# Patient Record
Sex: FEMALE | Race: WHITE | NOT HISPANIC OR LATINO | Employment: UNEMPLOYED | URBAN - METROPOLITAN AREA
[De-identification: names, ages, dates, MRNs, and addresses within clinical notes are randomized per-mention and may not be internally consistent; named-entity substitution may affect disease eponyms.]

---

## 2021-03-25 ENCOUNTER — APPOINTMENT (EMERGENCY)
Dept: CT IMAGING | Facility: HOSPITAL | Age: 76
DRG: 177 | End: 2021-03-25
Payer: COMMERCIAL

## 2021-03-25 ENCOUNTER — HOSPITAL ENCOUNTER (INPATIENT)
Facility: HOSPITAL | Age: 76
LOS: 2 days | Discharge: HOME | DRG: 177 | End: 2021-03-27
Attending: EMERGENCY MEDICINE | Admitting: STUDENT IN AN ORGANIZED HEALTH CARE EDUCATION/TRAINING PROGRAM
Payer: COMMERCIAL

## 2021-03-25 ENCOUNTER — APPOINTMENT (EMERGENCY)
Dept: RADIOLOGY | Facility: HOSPITAL | Age: 76
DRG: 177 | End: 2021-03-25
Payer: COMMERCIAL

## 2021-03-25 DIAGNOSIS — J96.01 ACUTE RESPIRATORY FAILURE WITH HYPOXIA (HCC): ICD-10-CM

## 2021-03-25 DIAGNOSIS — U07.1 PNEUMONIA DUE TO COVID-19 VIRUS: Primary | ICD-10-CM

## 2021-03-25 DIAGNOSIS — N17.9 ACUTE KIDNEY INJURY (HCC): ICD-10-CM

## 2021-03-25 DIAGNOSIS — J12.82 PNEUMONIA DUE TO COVID-19 VIRUS: Primary | ICD-10-CM

## 2021-03-25 PROBLEM — E66.9 DIABETES MELLITUS TYPE 2 IN OBESE (HCC): Status: ACTIVE | Noted: 2021-03-25

## 2021-03-25 PROBLEM — I63.9 CVA (CEREBRAL VASCULAR ACCIDENT) (HCC): Status: ACTIVE | Noted: 2021-03-25

## 2021-03-25 PROBLEM — E11.69 DIABETES MELLITUS TYPE 2 IN OBESE (HCC): Status: ACTIVE | Noted: 2021-03-25

## 2021-03-25 PROBLEM — R53.1 WEAKNESS: Status: ACTIVE | Noted: 2021-03-25

## 2021-03-25 LAB
ALBUMIN SERPL BCP-MCNC: 3.2 G/DL (ref 3.5–5)
ALP SERPL-CCNC: 52 U/L (ref 46–116)
ALT SERPL W P-5'-P-CCNC: 27 U/L (ref 12–78)
ANION GAP SERPL CALCULATED.3IONS-SCNC: 9 MMOL/L (ref 4–13)
AST SERPL W P-5'-P-CCNC: 42 U/L (ref 5–45)
ATRIAL RATE: 67 BPM
BASOPHILS # BLD AUTO: 0.01 THOUSANDS/ΜL (ref 0–0.1)
BASOPHILS NFR BLD AUTO: 0 % (ref 0–1)
BILIRUB SERPL-MCNC: 0.61 MG/DL (ref 0.2–1)
BILIRUB UR QL STRIP: NEGATIVE
BUN SERPL-MCNC: 39 MG/DL (ref 5–25)
CALCIUM ALBUM COR SERPL-MCNC: 9.1 MG/DL (ref 8.3–10.1)
CALCIUM SERPL-MCNC: 8.5 MG/DL (ref 8.3–10.1)
CHLORIDE SERPL-SCNC: 99 MMOL/L (ref 100–108)
CLARITY UR: CLEAR
CO2 SERPL-SCNC: 26 MMOL/L (ref 21–32)
COLOR UR: YELLOW
CREAT SERPL-MCNC: 2.18 MG/DL (ref 0.6–1.3)
CRP SERPL QL: 37 MG/L
D DIMER PPP FEU-MCNC: 2.29 UG/ML FEU
EOSINOPHIL # BLD AUTO: 0.01 THOUSAND/ΜL (ref 0–0.61)
EOSINOPHIL NFR BLD AUTO: 0 % (ref 0–6)
ERYTHROCYTE [DISTWIDTH] IN BLOOD BY AUTOMATED COUNT: 13.4 % (ref 11.6–15.1)
FERRITIN SERPL-MCNC: 342 NG/ML (ref 8–388)
FLUAV RNA RESP QL NAA+PROBE: NEGATIVE
FLUBV RNA RESP QL NAA+PROBE: NEGATIVE
GFR SERPL CREATININE-BSD FRML MDRD: 22 ML/MIN/1.73SQ M
GLUCOSE SERPL-MCNC: 115 MG/DL (ref 65–140)
GLUCOSE SERPL-MCNC: 178 MG/DL (ref 65–140)
GLUCOSE UR STRIP-MCNC: NEGATIVE MG/DL
HCT VFR BLD AUTO: 41.6 % (ref 34.8–46.1)
HGB BLD-MCNC: 13.7 G/DL (ref 11.5–15.4)
HGB UR QL STRIP.AUTO: NEGATIVE
IMM GRANULOCYTES # BLD AUTO: 0.01 THOUSAND/UL (ref 0–0.2)
IMM GRANULOCYTES NFR BLD AUTO: 0 % (ref 0–2)
KETONES UR STRIP-MCNC: NEGATIVE MG/DL
LACTATE SERPL-SCNC: 1.7 MMOL/L (ref 0.5–2)
LEUKOCYTE ESTERASE UR QL STRIP: NEGATIVE
LIPASE SERPL-CCNC: 295 U/L (ref 73–393)
LYMPHOCYTES # BLD AUTO: 0.85 THOUSANDS/ΜL (ref 0.6–4.47)
LYMPHOCYTES NFR BLD AUTO: 26 % (ref 14–44)
MCH RBC QN AUTO: 29.4 PG (ref 26.8–34.3)
MCHC RBC AUTO-ENTMCNC: 32.9 G/DL (ref 31.4–37.4)
MCV RBC AUTO: 89 FL (ref 82–98)
MONOCYTES # BLD AUTO: 0.36 THOUSAND/ΜL (ref 0.17–1.22)
MONOCYTES NFR BLD AUTO: 11 % (ref 4–12)
NEUTROPHILS # BLD AUTO: 2.08 THOUSANDS/ΜL (ref 1.85–7.62)
NEUTS SEG NFR BLD AUTO: 63 % (ref 43–75)
NITRITE UR QL STRIP: NEGATIVE
NRBC BLD AUTO-RTO: 0 /100 WBCS
P AXIS: 55 DEGREES
PH UR STRIP.AUTO: 6 [PH]
PLATELET # BLD AUTO: 109 THOUSANDS/UL (ref 149–390)
PLATELET # BLD AUTO: 122 THOUSANDS/UL (ref 149–390)
PMV BLD AUTO: 12.5 FL (ref 8.9–12.7)
PMV BLD AUTO: 12.6 FL (ref 8.9–12.7)
POTASSIUM SERPL-SCNC: 4 MMOL/L (ref 3.5–5.3)
PR INTERVAL: 154 MS
PROT SERPL-MCNC: 7.3 G/DL (ref 6.4–8.2)
PROT UR STRIP-MCNC: NEGATIVE MG/DL
QRS AXIS: 51 DEGREES
QRSD INTERVAL: 74 MS
QT INTERVAL: 434 MS
QTC INTERVAL: 458 MS
RBC # BLD AUTO: 4.66 MILLION/UL (ref 3.81–5.12)
RSV RNA RESP QL NAA+PROBE: NEGATIVE
SARS-COV-2 RNA RESP QL NAA+PROBE: POSITIVE
SODIUM SERPL-SCNC: 134 MMOL/L (ref 136–145)
SP GR UR STRIP.AUTO: 1.02 (ref 1–1.03)
T WAVE AXIS: 30 DEGREES
TROPONIN I SERPL-MCNC: <0.02 NG/ML
TSH SERPL DL<=0.05 MIU/L-ACNC: 3.07 UIU/ML (ref 0.36–3.74)
UROBILINOGEN UR QL STRIP.AUTO: 0.2 E.U./DL
VENTRICULAR RATE: 67 BPM
WBC # BLD AUTO: 3.32 THOUSAND/UL (ref 4.31–10.16)

## 2021-03-25 PROCEDURE — 83690 ASSAY OF LIPASE: CPT | Performed by: PHYSICIAN ASSISTANT

## 2021-03-25 PROCEDURE — 82728 ASSAY OF FERRITIN: CPT | Performed by: FAMILY MEDICINE

## 2021-03-25 PROCEDURE — 93005 ELECTROCARDIOGRAM TRACING: CPT

## 2021-03-25 PROCEDURE — 36415 COLL VENOUS BLD VENIPUNCTURE: CPT | Performed by: PHYSICIAN ASSISTANT

## 2021-03-25 PROCEDURE — 85049 AUTOMATED PLATELET COUNT: CPT | Performed by: FAMILY MEDICINE

## 2021-03-25 PROCEDURE — 81003 URINALYSIS AUTO W/O SCOPE: CPT | Performed by: PHYSICIAN ASSISTANT

## 2021-03-25 PROCEDURE — 96361 HYDRATE IV INFUSION ADD-ON: CPT

## 2021-03-25 PROCEDURE — XW033E5 INTRODUCTION OF REMDESIVIR ANTI-INFECTIVE INTO PERIPHERAL VEIN, PERCUTANEOUS APPROACH, NEW TECHNOLOGY GROUP 5: ICD-10-PCS | Performed by: FAMILY MEDICINE

## 2021-03-25 PROCEDURE — 99285 EMERGENCY DEPT VISIT HI MDM: CPT | Performed by: PHYSICIAN ASSISTANT

## 2021-03-25 PROCEDURE — 86140 C-REACTIVE PROTEIN: CPT | Performed by: PHYSICIAN ASSISTANT

## 2021-03-25 PROCEDURE — 93010 ELECTROCARDIOGRAM REPORT: CPT | Performed by: INTERNAL MEDICINE

## 2021-03-25 PROCEDURE — 99285 EMERGENCY DEPT VISIT HI MDM: CPT

## 2021-03-25 PROCEDURE — 84443 ASSAY THYROID STIM HORMONE: CPT | Performed by: PHYSICIAN ASSISTANT

## 2021-03-25 PROCEDURE — 0241U HB NFCT DS VIR RESP RNA 4 TRGT: CPT | Performed by: PHYSICIAN ASSISTANT

## 2021-03-25 PROCEDURE — 83605 ASSAY OF LACTIC ACID: CPT | Performed by: PHYSICIAN ASSISTANT

## 2021-03-25 PROCEDURE — 85025 COMPLETE CBC W/AUTO DIFF WBC: CPT | Performed by: PHYSICIAN ASSISTANT

## 2021-03-25 PROCEDURE — 82948 REAGENT STRIP/BLOOD GLUCOSE: CPT

## 2021-03-25 PROCEDURE — G1004 CDSM NDSC: HCPCS

## 2021-03-25 PROCEDURE — 84484 ASSAY OF TROPONIN QUANT: CPT | Performed by: PHYSICIAN ASSISTANT

## 2021-03-25 PROCEDURE — 85379 FIBRIN DEGRADATION QUANT: CPT | Performed by: FAMILY MEDICINE

## 2021-03-25 PROCEDURE — 80053 COMPREHEN METABOLIC PANEL: CPT | Performed by: PHYSICIAN ASSISTANT

## 2021-03-25 PROCEDURE — 96365 THER/PROPH/DIAG IV INF INIT: CPT

## 2021-03-25 PROCEDURE — 87040 BLOOD CULTURE FOR BACTERIA: CPT | Performed by: PHYSICIAN ASSISTANT

## 2021-03-25 PROCEDURE — 74176 CT ABD & PELVIS W/O CONTRAST: CPT

## 2021-03-25 PROCEDURE — 99223 1ST HOSP IP/OBS HIGH 75: CPT | Performed by: FAMILY MEDICINE

## 2021-03-25 PROCEDURE — 71045 X-RAY EXAM CHEST 1 VIEW: CPT

## 2021-03-25 RX ORDER — GABAPENTIN 300 MG/1
300 CAPSULE ORAL
COMMUNITY

## 2021-03-25 RX ORDER — CHLORTHALIDONE 25 MG/1
25 TABLET ORAL DAILY
COMMUNITY
Start: 2020-10-30

## 2021-03-25 RX ORDER — MELOXICAM 15 MG/1
TABLET ORAL
COMMUNITY
Start: 2021-03-15

## 2021-03-25 RX ORDER — ATENOLOL 50 MG/1
100 TABLET ORAL DAILY
Status: DISCONTINUED | OUTPATIENT
Start: 2021-03-25 | End: 2021-03-27 | Stop reason: HOSPADM

## 2021-03-25 RX ORDER — CLOPIDOGREL BISULFATE 75 MG/1
75 TABLET ORAL DAILY
COMMUNITY
Start: 2021-01-27

## 2021-03-25 RX ORDER — MIRABEGRON 25 MG/1
25 TABLET, FILM COATED, EXTENDED RELEASE ORAL DAILY
COMMUNITY
Start: 2021-01-04

## 2021-03-25 RX ORDER — HEPARIN SODIUM 5000 [USP'U]/ML
5000 INJECTION, SOLUTION INTRAVENOUS; SUBCUTANEOUS EVERY 8 HOURS SCHEDULED
Status: DISCONTINUED | OUTPATIENT
Start: 2021-03-25 | End: 2021-03-27 | Stop reason: HOSPADM

## 2021-03-25 RX ORDER — ASCORBIC ACID 500 MG
1000 TABLET ORAL EVERY 12 HOURS SCHEDULED
Status: DISCONTINUED | OUTPATIENT
Start: 2021-03-25 | End: 2021-03-27 | Stop reason: HOSPADM

## 2021-03-25 RX ORDER — ASPIRIN 81 MG/1
81 TABLET, CHEWABLE ORAL DAILY
Status: DISCONTINUED | OUTPATIENT
Start: 2021-03-26 | End: 2021-03-27 | Stop reason: HOSPADM

## 2021-03-25 RX ORDER — PANTOPRAZOLE SODIUM 40 MG/1
TABLET, DELAYED RELEASE ORAL
COMMUNITY
Start: 2021-03-15

## 2021-03-25 RX ORDER — ASPIRIN 325 MG
325 TABLET ORAL DAILY
COMMUNITY

## 2021-03-25 RX ORDER — CYCLOBENZAPRINE HCL 5 MG
5 TABLET ORAL 3 TIMES DAILY PRN
COMMUNITY

## 2021-03-25 RX ORDER — MELATONIN
2000 DAILY
Status: DISCONTINUED | OUTPATIENT
Start: 2021-03-25 | End: 2021-03-27 | Stop reason: HOSPADM

## 2021-03-25 RX ORDER — CLOPIDOGREL BISULFATE 75 MG/1
75 TABLET ORAL DAILY
Status: DISCONTINUED | OUTPATIENT
Start: 2021-03-25 | End: 2021-03-27 | Stop reason: HOSPADM

## 2021-03-25 RX ORDER — GABAPENTIN 300 MG/1
300 CAPSULE ORAL
Status: DISCONTINUED | OUTPATIENT
Start: 2021-03-25 | End: 2021-03-27 | Stop reason: HOSPADM

## 2021-03-25 RX ORDER — SODIUM CHLORIDE 9 MG/ML
75 INJECTION, SOLUTION INTRAVENOUS CONTINUOUS
Status: DISCONTINUED | OUTPATIENT
Start: 2021-03-25 | End: 2021-03-26

## 2021-03-25 RX ORDER — ASPIRIN 325 MG
325 TABLET ORAL DAILY
Status: DISCONTINUED | OUTPATIENT
Start: 2021-03-25 | End: 2021-03-25

## 2021-03-25 RX ORDER — DOXYCYCLINE HYCLATE 100 MG/1
100 CAPSULE ORAL EVERY 12 HOURS
Status: DISCONTINUED | OUTPATIENT
Start: 2021-03-25 | End: 2021-03-27 | Stop reason: HOSPADM

## 2021-03-25 RX ORDER — OXYBUTYNIN CHLORIDE 5 MG/1
5 TABLET, EXTENDED RELEASE ORAL DAILY
Status: DISCONTINUED | OUTPATIENT
Start: 2021-03-25 | End: 2021-03-27 | Stop reason: HOSPADM

## 2021-03-25 RX ORDER — ONDANSETRON 2 MG/ML
INJECTION INTRAMUSCULAR; INTRAVENOUS
Status: COMPLETED
Start: 2021-03-25 | End: 2021-03-25

## 2021-03-25 RX ORDER — CYCLOBENZAPRINE HCL 10 MG
5 TABLET ORAL 3 TIMES DAILY PRN
Status: DISCONTINUED | OUTPATIENT
Start: 2021-03-25 | End: 2021-03-27 | Stop reason: HOSPADM

## 2021-03-25 RX ORDER — PANTOPRAZOLE SODIUM 40 MG/1
40 TABLET, DELAYED RELEASE ORAL DAILY
Status: DISCONTINUED | OUTPATIENT
Start: 2021-03-25 | End: 2021-03-27 | Stop reason: HOSPADM

## 2021-03-25 RX ORDER — HYDROXYZINE HYDROCHLORIDE 25 MG/1
25 TABLET, FILM COATED ORAL EVERY 6 HOURS
COMMUNITY
Start: 2021-03-15

## 2021-03-25 RX ORDER — ZINC SULFATE 50(220)MG
220 CAPSULE ORAL DAILY
Status: DISCONTINUED | OUTPATIENT
Start: 2021-03-25 | End: 2021-03-27 | Stop reason: HOSPADM

## 2021-03-25 RX ORDER — DEXAMETHASONE SODIUM PHOSPHATE 4 MG/ML
6 INJECTION, SOLUTION INTRA-ARTICULAR; INTRALESIONAL; INTRAMUSCULAR; INTRAVENOUS; SOFT TISSUE EVERY 24 HOURS
Status: DISCONTINUED | OUTPATIENT
Start: 2021-03-25 | End: 2021-03-27 | Stop reason: HOSPADM

## 2021-03-25 RX ORDER — ATORVASTATIN CALCIUM 40 MG/1
40 TABLET, FILM COATED ORAL
Status: DISCONTINUED | OUTPATIENT
Start: 2021-03-25 | End: 2021-03-27 | Stop reason: HOSPADM

## 2021-03-25 RX ORDER — HEPARIN SODIUM 5000 [USP'U]/ML
5000 INJECTION, SOLUTION INTRAVENOUS; SUBCUTANEOUS EVERY 8 HOURS SCHEDULED
Status: DISCONTINUED | OUTPATIENT
Start: 2021-03-25 | End: 2021-03-25

## 2021-03-25 RX ORDER — DOXYCYCLINE HYCLATE 100 MG/1
100 CAPSULE ORAL ONCE
Status: COMPLETED | OUTPATIENT
Start: 2021-03-25 | End: 2021-03-25

## 2021-03-25 RX ORDER — ACETAMINOPHEN 160 MG/5ML
650 SUSPENSION, ORAL (FINAL DOSE FORM) ORAL EVERY 6 HOURS PRN
Status: DISCONTINUED | OUTPATIENT
Start: 2021-03-25 | End: 2021-03-27 | Stop reason: HOSPADM

## 2021-03-25 RX ORDER — GLIPIZIDE 2.5 MG/1
2.5 TABLET, EXTENDED RELEASE ORAL DAILY
COMMUNITY
Start: 2021-01-27 | End: 2022-01-27

## 2021-03-25 RX ORDER — ATORVASTATIN CALCIUM 20 MG/1
20 TABLET, FILM COATED ORAL DAILY
COMMUNITY
Start: 2021-01-04

## 2021-03-25 RX ORDER — TOLTERODINE 4 MG/1
CAPSULE, EXTENDED RELEASE ORAL
COMMUNITY
Start: 2020-12-15

## 2021-03-25 RX ORDER — LOSARTAN POTASSIUM 100 MG/1
100 TABLET ORAL DAILY
COMMUNITY
Start: 2020-06-24 | End: 2021-06-19

## 2021-03-25 RX ORDER — ONDANSETRON 2 MG/ML
4 INJECTION INTRAMUSCULAR; INTRAVENOUS EVERY 6 HOURS PRN
Status: DISCONTINUED | OUTPATIENT
Start: 2021-03-25 | End: 2021-03-27 | Stop reason: HOSPADM

## 2021-03-25 RX ORDER — MULTIVITAMIN/IRON/FOLIC ACID 18MG-0.4MG
1 TABLET ORAL DAILY
Status: DISCONTINUED | OUTPATIENT
Start: 2021-04-01 | End: 2021-03-27 | Stop reason: HOSPADM

## 2021-03-25 RX ORDER — ATENOLOL 100 MG/1
100 TABLET ORAL DAILY
COMMUNITY
Start: 2020-06-16 | End: 2021-06-11

## 2021-03-25 RX ADMIN — HEPARIN SODIUM 5000 UNITS: 5000 INJECTION INTRAVENOUS; SUBCUTANEOUS at 13:34

## 2021-03-25 RX ADMIN — Medication 2000 UNITS: at 13:35

## 2021-03-25 RX ADMIN — PANTOPRAZOLE SODIUM 40 MG: 40 TABLET, DELAYED RELEASE ORAL at 13:35

## 2021-03-25 RX ADMIN — CLOPIDOGREL BISULFATE 75 MG: 75 TABLET ORAL at 13:35

## 2021-03-25 RX ADMIN — ZINC SULFATE 220 MG (50 MG) CAPSULE 220 MG: CAPSULE at 13:35

## 2021-03-25 RX ADMIN — DOXYCYCLINE 100 MG: 100 CAPSULE ORAL at 11:36

## 2021-03-25 RX ADMIN — CEFTRIAXONE SODIUM 1000 MG: 10 INJECTION, POWDER, FOR SOLUTION INTRAVENOUS at 11:37

## 2021-03-25 RX ADMIN — OXYBUTYNIN 5 MG: 5 TABLET, FILM COATED, EXTENDED RELEASE ORAL at 14:04

## 2021-03-25 RX ADMIN — HEPARIN SODIUM 5000 UNITS: 5000 INJECTION INTRAVENOUS; SUBCUTANEOUS at 22:12

## 2021-03-25 RX ADMIN — SODIUM CHLORIDE 1000 ML: 0.9 INJECTION, SOLUTION INTRAVENOUS at 09:26

## 2021-03-25 RX ADMIN — DEXAMETHASONE SODIUM PHOSPHATE 6 MG: 4 INJECTION, SOLUTION INTRA-ARTICULAR; INTRALESIONAL; INTRAMUSCULAR; INTRAVENOUS; SOFT TISSUE at 13:32

## 2021-03-25 RX ADMIN — HEPARIN SODIUM 5000 UNITS: 5000 INJECTION INTRAVENOUS; SUBCUTANEOUS at 16:38

## 2021-03-25 RX ADMIN — OXYCODONE HYDROCHLORIDE AND ACETAMINOPHEN 1000 MG: 500 TABLET ORAL at 13:35

## 2021-03-25 RX ADMIN — REMDESIVIR 200 MG: 100 INJECTION, POWDER, LYOPHILIZED, FOR SOLUTION INTRAVENOUS at 14:03

## 2021-03-25 RX ADMIN — ATENOLOL 100 MG: 50 TABLET ORAL at 14:04

## 2021-03-25 RX ADMIN — ONDANSETRON 4 MG: 2 INJECTION INTRAMUSCULAR; INTRAVENOUS at 14:03

## 2021-03-25 RX ADMIN — ASPIRIN 325 MG ORAL TABLET 325 MG: 325 PILL ORAL at 13:35

## 2021-03-25 RX ADMIN — ATORVASTATIN CALCIUM 40 MG: 40 TABLET, FILM COATED ORAL at 22:38

## 2021-03-25 RX ADMIN — GABAPENTIN 300 MG: 300 CAPSULE ORAL at 22:12

## 2021-03-25 RX ADMIN — SODIUM CHLORIDE 75 ML/HR: 0.9 INJECTION, SOLUTION INTRAVENOUS at 16:37

## 2021-03-25 RX ADMIN — OXYCODONE HYDROCHLORIDE AND ACETAMINOPHEN 1000 MG: 500 TABLET ORAL at 22:00

## 2021-03-25 RX ADMIN — INSULIN LISPRO 1 UNITS: 100 INJECTION, SOLUTION INTRAVENOUS; SUBCUTANEOUS at 18:58

## 2021-03-25 NOTE — ASSESSMENT & PLAN NOTE
Lab Results   Component Value Date    HGBA1C 6 5 (H) 12/11/2020       · Holding oral hypoglycemic during hospitalization  · Sliding scale insulin ordered with a c  HS checks  · Consistent carb diet

## 2021-03-25 NOTE — ASSESSMENT & PLAN NOTE
· COVID-19 pneumonia noted on chest x-ray  · Started remdesivir  · IV Decadron  · IV ceftriaxone/doxycycline  · Vitamin C/D/zinc  · Monitor inflammatory markers  · D-dimer 2 29, started on heparin subcu Q q 8 hours    · Oxygen to maintain saturation over 90%  · Self proning

## 2021-03-25 NOTE — ASSESSMENT & PLAN NOTE
· Creatinine 2 1/baseline closer to 1 6  · Pre renal secondary to nausea vomiting diarrhea; Start IV fluids  · Please note urinalysis was negative for UTI    · Hold chlorthalidone  · Monitor creatinine closely

## 2021-03-25 NOTE — H&P
[address]  H&P- Deedee Cota 1945, 76 y o  female MRN: [id number]  Unit/Bed#: FT 05 Encounter: [id number]  Primary Care Provider: No primary care provider on file  Date and time admitted to hospital: 3/25/2021  8:55 AM    Acute respiratory failure with hypoxia (HCC)  Assessment & Plan  · Patient dropped into the 80s on room air/currently on 2 L oxygen via nasal cannula  · Likely secondary to bilateral COVID-19 pneumonia noted on chest x-ray    Pneumonia due to COVID-19 virus  Assessment & Plan  · COVID-19 pneumonia noted on chest x-ray  · Started remdesivir  · IV Decadron  · IV ceftriaxone/doxycycline  · Vitamin C/D/zinc  · Monitor inflammatory markers  · D-dimer 2 29, started on heparin subcu Q q 8 hours  · Oxygen to maintain saturation over 90%  · Self proning    Acute renal failure (ARF) (HCC)  Assessment & Plan  · Creatinine 2 1/baseline closer to 1 6  · Pre renal secondary to nausea vomiting diarrhea; Start IV fluids  · Please note urinalysis was negative for UTI  · Hold chlorthalidone  · Monitor creatinine closely    Diabetes mellitus type 2 in obese [facility])  Assessment & Plan  Lab Results   Component Value Date    HGBA1C 6 5 (H) 12/11/2020       · Holding oral hypoglycemic during hospitalization  · Sliding scale insulin ordered with a c  HS checks  · Consistent carb diet  CVA (cerebral vascular accident) [facility])  Assessment & Plan  · Continue with aspirin/Plavix/atorvastatin    Weakness  Assessment & Plan  Secondary to the nausea vomiting diarrhea  Get PT OT eval       VTE Prophylaxis: Heparin  / sequential compression device   Code Status: DNR/DNI    Anticipated Length of Stay:  Patient will be admitted on an Inpatient basis with an anticipated length of stay of  OVER 2 midnights  Justification for Hospital Stay: Hypoxia/covid 19 pneumonia/ acute renal failure    Total Time for Visit, including Counseling / Coordination of Care: 45 minutes    Greater than 50% of this total time spent on direct patient counseling and coordination of care  Chief Complaint:   Nausea/ vomiting/ diarrhea    History of Present Illness:    Nimo Aguilar is a 76 y o  female with known history of type 2 diabetes mellitus/hypertension/prior history of CVA on dual antiplatelet therapy who presented to the emergency department today with complaints of nausea/vomiting/diarrhea which has been present for several weeks  She reports symptoms have been on and off, vomitus did not contain any blood only food particles  It is associated with multiple episodes of diarrhea again nonbloody  Also reports associated abdominal cramping which is all over her anterior abdomen  No chest pain  She has had an on and off productive cough and some mild shortness of breath  She does report a headache as well along with significant lethargy  She was unable to keep anything down and was having significant weakness due to which she presented to the emergency department  In the ED she had a temperature of 37 5°/no tachycardia but oxygen saturation did drop into the 80s for which she is now on 2 L oxygen via nasal cannula  Labs noted normal electrolytes/creatinine was slightly bumped at 2 18/LFTs were normal/lipase 295  Troponin less than 0 02  Lactate 1 7  CBC noted normal hemoglobin at 13 7/slight leukopenia with white count of 3 32  D-dimer 2 29, CRP 37  Influenza/RSV PCR negative, COVID-19 PCR positive  UA was negative  Two sets of blood cultures sent  Chest x-ray did note moderate bilateral peripheral ground-glass opacities COVID-19 pneumonia  CT abdomen pelvis was suggestive of diverticulosis without any evidence for acute diverticulitis     Patient being admitted under the hospitalist service for management of the bilateral COVID-19 pneumonia/acute renal failure  Review of Systems:    Review of Systems   Constitutional: Positive for activity change, appetite change, chills and fatigue  Negative for fever     HENT: Negative for congestion, ear discharge, ear pain, rhinorrhea and sore throat  Respiratory: Positive for cough and shortness of breath  Cardiovascular: Negative for chest pain, palpitations and leg swelling  Gastrointestinal: Positive for abdominal pain, diarrhea and nausea  Genitourinary: Negative for dysuria, flank pain and frequency  Musculoskeletal: Positive for back pain and myalgias  Neurological: Positive for light-headedness  Negative for seizures, syncope and speech difficulty  Psychiatric/Behavioral: Negative  Past Medical and Surgical History:     Past Medical History:   Diagnosis Date    Diabetes mellitus ([facility] [address] )     type 2    Hypertension     Stroke [facility])        History reviewed  No pertinent surgical history  Meds/Allergies:    Prior to Admission medications    Medication Sig Start Date End Date Taking?  Authorizing Provider   aspirin 325 mg tablet Take 325 mg by mouth daily   Yes Historical Provider, MD   atenolol (TENORMIN) 100 mg tablet Take 100 mg by mouth daily 6/16/20 6/11/21 Yes Historical Provider, MD   atorvastatin (LIPITOR) 20 mg tablet Take 20 mg by mouth daily 1/4/21  Yes Historical Provider, MD   betamethasone valerate (VALISONE) 0 1 % cream Apply topically 2 (two) times a day   Yes Historical Provider, MD   chlorthalidone 25 mg tablet Take 25 mg by mouth daily 10/30/20  Yes Historical Provider, MD   clopidogrel (PLAVIX) 75 mg tablet Take 75 mg by mouth daily 1/27/21  Yes Historical Provider, MD   cyclobenzaprine (FLEXERIL) 5 mg tablet Take 5 mg by mouth 3 (three) times a day as needed for muscle spasms (1-2tabs)   Yes Historical Provider, MD   gabapentin (NEURONTIN) 300 mg capsule Take 300 mg by mouth daily at bedtime   Yes Historical Provider, MD   glipiZIDE (GLUCOTROL XL) 2 5 mg 24 hr tablet Take 2 5 mg by mouth daily 1/27/21 1/27/22 Yes Historical Provider, MD   hydrOXYzine HCL (ATARAX) 25 mg tablet Take 25 mg by mouth every 6 (six) hours 3/15/21  Yes Historical Provider, MD   losartan (COZAAR) 100 MG tablet Take 100 mg by mouth daily 6/24/20 6/19/21 Yes Historical Provider, MD   meloxicam (MOBIC) 15 mg tablet TAKE 1 TABLET BY MOUTH EVERY DAY 3/15/21  Yes Historical Provider, MD   Mirabegron ER (Myrbetriq) 25 MG TB24 Take 25 mg by mouth daily 1/4/21  Yes Historical Provider, MD   neomycin-polymyxin-dexamethasone (MAXITROL) 0 1 % ophthalmic suspension Apply 1 drop to eye 4 (four) times a day   Yes Historical Provider, MD   pantoprazole (PROTONIX) 40 mg tablet TAKE 1 TABLET BY MOUTH EVERY DAY 3/15/21  Yes Historical Provider, MD   tolterodine (DETROL LA) 4 mg 24 hr capsule TAKE 1 CAPSULE BY MOUTH EVERY DAY 12/15/20  Yes Historical Provider, MD   Calcium Carbonate-Vitamin D 500-125 MG-UNIT TABS Take 1 tablet by mouth    Historical Provider, MD     I have reviewed home medications with patient personally  Allergies: Allergies   Allergen Reactions    Amlodipine Swelling     Leg Edema    Latex Other (See Comments)       Social History:     Marital Status:      Social History     Substance and Sexual Activity   Alcohol Use Never    Frequency: Never     Social History     Tobacco Use   Smoking Status Never Smoker   Smokeless Tobacco Never Used     Social History     Substance and Sexual Activity   Drug Use Never       Family History:    non-contributory    Physical Exam:     Vitals:   Blood Pressure: 100/57 (03/25/21 1530)  Pulse: 62 (03/25/21 1530)  Temperature: 99 5 °F (37 5 °C) (03/25/21 0856)  Temp Source: Oral (03/25/21 0856)  Respirations: 22 (03/25/21 1530)  Height: 5' 4" (162 6 cm) (03/25/21 0856)  Weight - Scale: 99 9 kg (220 lb 3 8 oz) (03/25/21 0856)  SpO2: 95 % (03/25/21 1530)    Physical Exam  Constitutional:       General: She is not in acute distress  Appearance: She is obese  She is not toxic-appearing  HENT:      Nose: Nose normal       Mouth/Throat:      Mouth: Mucous membranes are dry     Eyes:      Conjunctiva/sclera: Conjunctivae normal       Pupils: Pupils are equal, round, and reactive to light  Neck:      Musculoskeletal: Normal range of motion and neck supple  Cardiovascular:      Rate and Rhythm: Normal rate and regular rhythm  Pulses: Normal pulses  Heart sounds: Normal heart sounds  Pulmonary:      Effort: Pulmonary effort is normal       Breath sounds: Normal breath sounds  Abdominal:      General: Bowel sounds are normal       Palpations: Abdomen is soft  Musculoskeletal: Normal range of motion  Skin:     General: Skin is warm and dry  Neurological:      General: No focal deficit present  Mental Status: She is alert and oriented to person, place, and time  Psychiatric:         Mood and Affect: Mood normal          Behavior: Behavior normal            Additional Data:     Lab Results: I have personally reviewed pertinent reports  Results from last 7 days   Lab Units 03/25/21  0925   WBC Thousand/uL 3 32*   HEMOGLOBIN g/dL 13 7   HEMATOCRIT % 41 6   PLATELETS Thousands/uL 122*   NEUTROS PCT % 63   LYMPHS PCT % 26   MONOS PCT % 11   EOS PCT % 0     Results from last 7 days   Lab Units 03/25/21  0925   SODIUM mmol/L 134*   POTASSIUM mmol/L 4 0   CHLORIDE mmol/L 99*   CO2 mmol/L 26   BUN mg/dL 39*   CREATININE mg/dL 2 18*   ANION GAP mmol/L 9   CALCIUM mg/dL 8 5   ALBUMIN g/dL 3 2*   TOTAL BILIRUBIN mg/dL 0 61   ALK PHOS U/L 52   ALT U/L 27   AST U/L 42   GLUCOSE RANDOM mg/dL 115                 Results from last 7 days   Lab Units 03/25/21  0925   LACTIC ACID mmol/L 1 7       Imaging: I have personally reviewed pertinent reports  CT abdomen pelvis wo contrast   Final Result by Vilma Burt MD (03/25 1110)      Diverticulosis without evidence for acute diverticulitis  2 isodense nodules on the right kidney for which further evaluation with renal ultrasound or contrast enhanced CT should be considered  Mild hepatic steatosis        The study was marked in EPIC for significant notification  Workstation performed: [id number]         XR chest 1 view portable   Final Result by Candice Tyson MD (03/25 1059)      Moderate bilateral peripheral groundglass opacity compatible with Covid 19 pneumonia  Moderate hiatal hernia  Workstation performed: [id number]             Allscripts / Epic Records Reviewed: Yes     ** Please Note: This note has been constructed using a voice recognition system   **

## 2021-03-25 NOTE — ED PROVIDER NOTES
History  Chief Complaint   Patient presents with    Lethargy     pt c/o excessive lethargy and sleeping for the past three weeks  also c/o decreased appetite     77yo female with a history of type 2 diabetes and previous stroke on Plavix presenting for evaluation of fatigue and generalized weakness for the past several weeks  Patient states she has been sleeping through most of the day and admits to a decreased appetite  Patient is tolerating fluids without difficulty but states she has not been eating much solid food  Patient admits to becoming nauseous with eating but denies any vomiting  She believes she lost weight but is unsure of how much  Patient admits to pain in her left lower abdomen which has been present for the past 3 weeks  Patient additionally complains of chills and a cough  Patient states she coughed up phlegm that appeared similar to "cottage cheese" last night  Her cough is currently minimal but she admits to some dyspnea  Patient's son apparently was tested for COVID-19 recently but she is unsure of the result  Patient denies any chest pain  She ambulates with a cane at home and denies any recent falls         History provided by:  Patient   used: No    Fatigue  Severity:  Moderate  Onset quality:  Gradual  Duration:  3 weeks  Timing:  Constant  Progression:  Worsening  Chronicity:  New  Relieved by:  Nothing  Worsened by:  Nothing  Ineffective treatments:  Rest  Associated symptoms: abdominal pain, anorexia, cough, myalgias, nausea and shortness of breath    Associated symptoms: no chest pain, no diarrhea, no difficulty walking, no dizziness, no drooling, no dysphagia, no dysuria, no numbness in extremities, no falls, no fever, no foul-smelling urine, no frequency, no loss of consciousness, no seizures, no sensory-motor deficit, no stroke symptoms, no syncope, no vision change and no vomiting        Prior to Admission Medications   Prescriptions Last Dose Informant Patient Reported? Taking? Calcium Carbonate-Vitamin D 500-125 MG-UNIT TABS   Yes No   Sig: Take 1 tablet by mouth   Mirabegron ER (Myrbetriq) 25 MG TB24   Yes Yes   Sig: Take 25 mg by mouth daily   aspirin 325 mg tablet   Yes Yes   Sig: Take 325 mg by mouth daily   atenolol (TENORMIN) 100 mg tablet   Yes Yes   Sig: Take 100 mg by mouth daily   atorvastatin (LIPITOR) 20 mg tablet   Yes Yes   Sig: Take 20 mg by mouth daily   betamethasone valerate (VALISONE) 0 1 % cream   Yes Yes   Sig: Apply topically 2 (two) times a day   chlorthalidone 25 mg tablet   Yes Yes   Sig: Take 25 mg by mouth daily   clopidogrel (PLAVIX) 75 mg tablet   Yes Yes   Sig: Take 75 mg by mouth daily   cyclobenzaprine (FLEXERIL) 5 mg tablet   Yes Yes   Sig: Take 5 mg by mouth 3 (three) times a day as needed for muscle spasms (1-2tabs)   gabapentin (NEURONTIN) 300 mg capsule   Yes Yes   Sig: Take 300 mg by mouth daily at bedtime   glipiZIDE (GLUCOTROL XL) 2 5 mg 24 hr tablet   Yes Yes   Sig: Take 2 5 mg by mouth daily   hydrOXYzine HCL (ATARAX) 25 mg tablet   Yes Yes   Sig: Take 25 mg by mouth every 6 (six) hours   losartan (COZAAR) 100 MG tablet   Yes Yes   Sig: Take 100 mg by mouth daily   meloxicam (MOBIC) 15 mg tablet   Yes Yes   Sig: TAKE 1 TABLET BY MOUTH EVERY DAY   neomycin-polymyxin-dexamethasone (MAXITROL) 0 1 % ophthalmic suspension   Yes Yes   Sig: Apply 1 drop to eye 4 (four) times a day   pantoprazole (PROTONIX) 40 mg tablet   Yes Yes   Sig: TAKE 1 TABLET BY MOUTH EVERY DAY   tolterodine (DETROL LA) 4 mg 24 hr capsule   Yes Yes   Sig: TAKE 1 CAPSULE BY MOUTH EVERY DAY      Facility-Administered Medications: None       Past Medical History:   Diagnosis Date    Diabetes mellitus ([facility] [address] )     type 2    Hypertension     Stroke [facility])        History reviewed  No pertinent surgical history  History reviewed  No pertinent family history  I have reviewed and agree with the history as documented      E-Cigarette/Vaping    E-Cigarette Use Never User      E-Cigarette/Vaping Substances     Social History     Tobacco Use    Smoking status: Never Smoker    Smokeless tobacco: Never Used   Substance Use Topics    Alcohol use: Never     Frequency: Never    Drug use: Never       Review of Systems   Constitutional: Positive for appetite change and fatigue  Negative for chills and fever  HENT: Negative for drooling, ear pain and voice change  Eyes: Negative for discharge and redness  Respiratory: Positive for cough and shortness of breath  Negative for stridor  Cardiovascular: Negative for chest pain, leg swelling and syncope  Gastrointestinal: Positive for abdominal pain, anorexia and nausea  Negative for diarrhea, dysphagia and vomiting  Genitourinary: Negative for dysuria and frequency  Musculoskeletal: Positive for myalgias  Negative for falls, neck pain and neck stiffness  Skin: Negative for color change and rash  Neurological: Positive for weakness  Negative for dizziness, seizures, loss of consciousness and syncope  Psychiatric/Behavioral: Negative for confusion  The patient is not nervous/anxious  All other systems reviewed and are negative  Physical Exam  Physical Exam  Vitals signs and nursing note reviewed  Constitutional:       General: She is not in acute distress  Appearance: She is well-developed  She is not diaphoretic  Comments: Appears fatigued, non-toxic   HENT:      Head: Normocephalic and atraumatic  Right Ear: External ear normal       Left Ear: External ear normal       Nose: Nose normal    Eyes:      General: No scleral icterus  Right eye: No discharge  Left eye: No discharge  Conjunctiva/sclera: Conjunctivae normal    Neck:      Musculoskeletal: Normal range of motion and neck supple  Cardiovascular:      Rate and Rhythm: Normal rate and regular rhythm  Heart sounds: Normal heart sounds  No murmur     Pulmonary:      Effort: Pulmonary effort is normal  No respiratory distress  Breath sounds: Normal breath sounds  No stridor  No wheezing or rales  Abdominal:      General: Bowel sounds are normal  There is no distension  Palpations: Abdomen is soft  Tenderness: There is abdominal tenderness in the left lower quadrant  There is no guarding or rebound  Musculoskeletal: Normal range of motion  General: No deformity  Lymphadenopathy:      Cervical: No cervical adenopathy  Skin:     General: Skin is warm and dry  Neurological:      Mental Status: She is alert  She is not disoriented  GCS: GCS eye subscore is 4  GCS verbal subscore is 5  GCS motor subscore is 6  Psychiatric:         Behavior: Behavior normal          Vital Signs  ED Triage Vitals [03/25/21 0856]   Temperature Pulse Respirations Blood Pressure SpO2   99 5 °F (37 5 °C) 69 19 118/71 95 %      Temp Source Heart Rate Source Patient Position - Orthostatic VS BP Location FiO2 (%)   Oral Monitor Sitting Right arm --      Pain Score       6           Vitals:    03/25/21 0856 03/25/21 1000 03/25/21 1045 03/25/21 1145   BP: 118/71 113/75 127/64 (!) 135/106   Pulse: 69 63 66 65   Patient Position - Orthostatic VS: Sitting Lying Lying Sitting         Visual Acuity      ED Medications  Medications   ceftriaxone (ROCEPHIN) 1 g/50 mL in dextrose IVPB (1,000 mg Intravenous New Bag 3/25/21 1137)   sodium chloride 0 9 % bolus 1,000 mL (0 mL Intravenous Stopped 3/25/21 1019)   doxycycline hyclate (VIBRAMYCIN) capsule 100 mg (100 mg Oral Given 3/25/21 1136)       Diagnostic Studies  Results Reviewed     Procedure Component Value Units Date/Time    C-reactive protein [[id number]] Collected: 03/25/21 0925    Lab Status: In process Specimen: Blood from Arm, Right Updated: 03/25/21 1148    UA w Reflex to Microscopic w Reflex to Culture [[id number]] Collected: 03/25/21 1135    Lab Status:  In process Specimen: Urine, Clean Catch Updated: 03/25/21 1138    Blood culture #1 [[id number]] Collected: 03/25/21 1128 Lab Status: In process Specimen: Blood from Arm, Right Updated: 03/25/21 1131    Blood culture #2 [[id number]] Collected: 03/25/21 1128    Lab Status: In process Specimen: Blood from Arm, Left Updated: 03/25/21 1131    COVID19, Influenza A/B, RSV PCR, [facility] [[id number]]  (Abnormal) Collected: 03/25/21 0927    Lab Status: Final result Specimen: Nares from Nasopharyngeal Swab Updated: 03/25/21 1017     SARS-CoV-2 Positive     INFLUENZA A PCR Negative     INFLUENZA B PCR Negative     RSV PCR Negative    Narrative: This test has been authorized by FDA under an EUA (Emergency Use Assay) for use by authorized laboratories  Clinical caution and judgement should be used with the interpretation of these results with consideration of the clinical impression and other laboratory testing  Testing reported as "Positive" or "Negative" has been proven to be accurate according to standard laboratory validation requirements  All testing is performed with control materials showing appropriate reactivity at standard intervals      Comprehensive metabolic panel [[id number]]  (Abnormal) Collected: 03/25/21 0925    Lab Status: Final result Specimen: Blood from Arm, Right Updated: 03/25/21 1008     Sodium 134 mmol/L      Potassium 4 0 mmol/L      Chloride 99 mmol/L      CO2 26 mmol/L      ANION GAP 9 mmol/L      BUN 39 mg/dL      Creatinine 2 18 mg/dL      Glucose 115 mg/dL      Calcium 8 5 mg/dL      Corrected Calcium 9 1 mg/dL      AST 42 U/L      ALT 27 U/L      Alkaline Phosphatase 52 U/L      Total Protein 7 3 g/dL      Albumin 3 2 g/dL      Total Bilirubin 0 61 mg/dL      eGFR 22 ml/min/1 73sq m     Narrative:      [place] guidelines for Chronic Kidney Disease (CKD):     Stage 1 with normal or high GFR (GFR > 90 mL/min/1 73 square meters)    Stage 2 Mild CKD (GFR = 60-89 mL/min/1 73 square meters)    Stage 3A Moderate CKD (GFR = 45-59 mL/min/1 73 square meters)    Stage 3B Moderate CKD (GFR = 30-44 mL/min/1 73 square meters)    Stage 4 Severe CKD (GFR = 15-29 mL/min/1 73 square meters)    Stage 5 End Stage CKD (GFR <15 mL/min/1 73 square meters)  Note: GFR calculation is accurate only with a steady state creatinine    TSH, 3rd generation with Free T4 reflex [[id number]]  (Normal) Collected: 03/25/21 0925    Lab Status: Final result Specimen: Blood from Arm, Right Updated: 03/25/21 1008     TSH 3RD GENERATON 3 069 uIU/mL     Narrative:      Patients undergoing fluorescein dye angiography may retain small amounts of fluorescein in the body for 48-72 hours post procedure  Samples containing fluorescein can produce falsely depressed TSH values  If the patient had this procedure,a specimen should be resubmitted post fluorescein clearance  Lipase [[id number]]  (Normal) Collected: 03/25/21 0925    Lab Status: Final result Specimen: Blood from Arm, Right Updated: 03/25/21 1008     Lipase 295 u/L     Lactic acid [[id number]]  (Normal) Collected: 03/25/21 0925    Lab Status: Final result Specimen: Blood from Arm, Right Updated: 03/25/21 1001     LACTIC ACID 1 7 mmol/L     Narrative:      Result may be elevated if tourniquet was used during collection      Troponin I [[id number]]  (Normal) Collected: 03/25/21 0925    Lab Status: Final result Specimen: Blood from Arm, Right Updated: 03/25/21 0956     Troponin I <0 02 ng/mL     CBC and differential [[id number]]  (Abnormal) Collected: 03/25/21 0925    Lab Status: Final result Specimen: Blood from Arm, Right Updated: 03/25/21 0942     WBC 3 32 Thousand/uL      RBC 4 66 Million/uL      Hemoglobin 13 7 g/dL      Hematocrit 41 6 %      MCV 89 fL      MCH 29 4 pg      MCHC 32 9 g/dL      RDW 13 4 %      MPV 12 6 fL      Platelets 512 Thousands/uL      nRBC 0 /100 WBCs      Neutrophils Relative 63 %      Immat GRANS % 0 %      Lymphocytes Relative 26 %      Monocytes Relative 11 %      Eosinophils Relative 0 %      Basophils Relative 0 %      Neutrophils Absolute 2 08 Thousands/µL      Immature Grans Absolute 0 01 Thousand/uL      Lymphocytes Absolute 0 85 Thousands/µL      Monocytes Absolute 0 36 Thousand/µL      Eosinophils Absolute 0 01 Thousand/µL      Basophils Absolute 0 01 Thousands/µL                  CT abdomen pelvis wo contrast   Final Result by Veronique Chavarria MD (03/25 1110)      Diverticulosis without evidence for acute diverticulitis  2 isodense nodules on the right kidney for which further evaluation with renal ultrasound or contrast enhanced CT should be considered  Mild hepatic steatosis  The study was marked in EPIC for significant notification  Workstation performed: [id number]         XR chest 1 view portable   Final Result by Veronica Howard MD (03/25 1059)      Moderate bilateral peripheral groundglass opacity compatible with Covid 19 pneumonia  Moderate hiatal hernia  Workstation performed: [id number]                    Procedures  ECG 12 Lead Documentation Only    Date/Time: 3/25/2021 3:58 PM  Performed by: Coco Ramirez PA-C  Authorized by: Coco Ramirez PA-C     Indications / Diagnosis:  Weakness  ECG reviewed by me, the ED Provider: yes    Patient location:  ED  Interpretation:     Interpretation: normal    Rate:     ECG rate:  67    ECG rate assessment: normal    Rhythm:     Rhythm: sinus rhythm    Ectopy:     Ectopy: none    QRS:     QRS axis:  Normal    QRS intervals:  Normal  Conduction:     Conduction: normal    ST segments:     ST segments:  Normal  T waves:     T waves: normal               ED Course  ED Course as of Mar 25 1200   Thu Mar 25, 2021   1017 Creatinine 1 65 five days ago  Troponin I: <0 02   1020 SARS-COV-2(!): Positive               Identification of Seniors at Risk      Most Recent Value   (ISAR) Identification of Seniors at Risk   Before the illness or injury that brought you to the Emergency, did you need someone to help you on a regular basis?   1 Filed at: 03/25/2021 0900   In the last 24 hours, have you needed more help than usual?  0 Filed at: 03/25/2021 0900   Have you been hospitalized for one or more nights during the past 6 months? 0 Filed at: 03/25/2021 0900   In general, do you see well?  0 Filed at: 03/25/2021 0900   In general, do you have serious problems with your memory? 0 Filed at: 03/25/2021 0900   Do you take more than three different medications every day? 1 Filed at: 03/25/2021 0900   ISAR Score  2 Filed at: 03/25/2021 0900                    SBIRT 20yo+      Most Recent Value   SBIRT (25 yo +)   In order to provide better care to our patients, we are screening all of our patients for alcohol and drug use  Would it be okay to ask you these screening questions? Yes Filed at: 03/25/2021 1049   Initial Alcohol Screen: US AUDIT-C    1  How often do you have a drink containing alcohol?  0 Filed at: 03/25/2021 1049   2  How many drinks containing alcohol do you have on a typical day you are drinking? 0 Filed at: 03/25/2021 1049   3b  FEMALE Any Age, or MALE 65+: How often do you have 4 or more drinks on one occassion? 0 Filed at: 03/25/2021 1049   Audit-C Score  0 Filed at: 03/25/2021 1049   KEEGAN: How many times in the past year have you    Used an illegal drug or used a prescription medication for non-medical reasons? Never Filed at: 03/25/2021 1049                    MDM  Number of Diagnoses or Management Options  Acute kidney injury [facility]): new and requires workup  Acute respiratory failure with hypoxia ([facility] [address] ): new and requires workup  Pneumonia due to COVID-19 virus: new and requires workup  Diagnosis management comments: 77yo female presenting for weakness, anorexia, and fatigue x several weeks  Is not eating much due to her symptoms  Patient also has multiple other complaints including shortness of breath and abdominal pain  Patient's son was recently tested for COVID  She is afebrile and hemodynamically stable   Differential diagnosis includes but is not limited to: viral syndrome/COVID, pneumonia, intra-abdominal infection, UTI, dehydration, SOLIS    Initial ED plan: Check cardiac labs, TSH, lactate, UA, COVID swab, CXR, and CT abdomen  IV fluid bolus  Final assessment: Oxygen saturation was initially in the low 90s but patient dipped to 88-89% while in ED and was placed on 2L NC  Labs significant for an SOLIS with a creatinine of 2 1 (this is up from 1 6 last week)  COVID test is positive  CXR with moderate COVID pneumonia  CT abdomen negative for acute findings  Given oxygen requirements and SOLIS, will admit  Amount and/or Complexity of Data Reviewed  Clinical lab tests: ordered and reviewed  Tests in the radiology section of CPT®: ordered and reviewed  Tests in the medicine section of CPT®: ordered and reviewed  Review and summarize past medical records: yes  Discuss the patient with other providers: yes  Independent visualization of images, tracings, or specimens: yes    Risk of Complications, Morbidity, and/or Mortality  Presenting problems: moderate  Diagnostic procedures: moderate  Management options: moderate        Disposition  Final diagnoses:   Pneumonia due to COVID-19 virus   Acute respiratory failure with hypoxia ([facility] [address] )   Acute kidney injury ([facility] [address] )     Time reflects when diagnosis was documented in both MDM as applicable and the Disposition within this note     Time User Action Codes Description Comment    3/25/2021 11:32 AM Supriya Monroe Add [U07 1,  J12 82] Pneumonia due to COVID-19 virus     3/25/2021 11:32 AM Supriya Monroe Add [J96 01] Acute respiratory failure with hypoxia ([facility] [address] )     3/25/2021 11:32 AM Supriya Monroe Add [N17 9] Acute kidney injury [facility])       ED Disposition     ED Disposition Condition Date/Time Comment    Admit Stable Thu Mar 25, 2021 12:00 PM Case was discussed with Dr Amy Angela and the patient's admission status was agreed to be Admission Status: inpatient status to the service of Dr Amy Angela   Follow-up Information    None         Patient's Medications   Discharge Prescriptions    No medications on file     No discharge procedures on file      PDMP Review     None          ED Provider  Electronically Signed by           Suzy Vu PA-C  03/25/21 7485

## 2021-03-25 NOTE — ASSESSMENT & PLAN NOTE
· Patient dropped into the 80s on room air/currently on 2 L oxygen via nasal cannula  · Likely secondary to bilateral COVID-19 pneumonia noted on chest x-ray

## 2021-03-26 LAB
ALBUMIN SERPL BCP-MCNC: 2.9 G/DL (ref 3.5–5)
ALP SERPL-CCNC: 51 U/L (ref 46–116)
ALT SERPL W P-5'-P-CCNC: 24 U/L (ref 12–78)
ANION GAP SERPL CALCULATED.3IONS-SCNC: 12 MMOL/L (ref 4–13)
AST SERPL W P-5'-P-CCNC: 31 U/L (ref 5–45)
BASOPHILS # BLD AUTO: 0 THOUSANDS/ΜL (ref 0–0.1)
BASOPHILS NFR BLD AUTO: 0 % (ref 0–1)
BILIRUB SERPL-MCNC: 0.43 MG/DL (ref 0.2–1)
BUN SERPL-MCNC: 33 MG/DL (ref 5–25)
CALCIUM ALBUM COR SERPL-MCNC: 9.1 MG/DL (ref 8.3–10.1)
CALCIUM SERPL-MCNC: 8.2 MG/DL (ref 8.3–10.1)
CHLORIDE SERPL-SCNC: 104 MMOL/L (ref 100–108)
CO2 SERPL-SCNC: 22 MMOL/L (ref 21–32)
CREAT SERPL-MCNC: 1.5 MG/DL (ref 0.6–1.3)
CRP SERPL QL: 37.9 MG/L
EOSINOPHIL # BLD AUTO: 0 THOUSAND/ΜL (ref 0–0.61)
EOSINOPHIL NFR BLD AUTO: 0 % (ref 0–6)
ERYTHROCYTE [DISTWIDTH] IN BLOOD BY AUTOMATED COUNT: 13.3 % (ref 11.6–15.1)
GFR SERPL CREATININE-BSD FRML MDRD: 34 ML/MIN/1.73SQ M
GLUCOSE SERPL-MCNC: 125 MG/DL (ref 65–140)
GLUCOSE SERPL-MCNC: 125 MG/DL (ref 65–140)
GLUCOSE SERPL-MCNC: 138 MG/DL (ref 65–140)
GLUCOSE SERPL-MCNC: 146 MG/DL (ref 65–140)
GLUCOSE SERPL-MCNC: 217 MG/DL (ref 65–140)
HCT VFR BLD AUTO: 42.5 % (ref 34.8–46.1)
HGB BLD-MCNC: 13.5 G/DL (ref 11.5–15.4)
IMM GRANULOCYTES # BLD AUTO: 0.01 THOUSAND/UL (ref 0–0.2)
IMM GRANULOCYTES NFR BLD AUTO: 1 % (ref 0–2)
INR PPP: 1.11 (ref 0.84–1.19)
LYMPHOCYTES # BLD AUTO: 0.69 THOUSANDS/ΜL (ref 0.6–4.47)
LYMPHOCYTES NFR BLD AUTO: 38 % (ref 14–44)
MCH RBC QN AUTO: 29.4 PG (ref 26.8–34.3)
MCHC RBC AUTO-ENTMCNC: 31.8 G/DL (ref 31.4–37.4)
MCV RBC AUTO: 93 FL (ref 82–98)
MONOCYTES # BLD AUTO: 0.24 THOUSAND/ΜL (ref 0.17–1.22)
MONOCYTES NFR BLD AUTO: 13 % (ref 4–12)
NEUTROPHILS # BLD AUTO: 0.88 THOUSANDS/ΜL (ref 1.85–7.62)
NEUTS SEG NFR BLD AUTO: 48 % (ref 43–75)
NRBC BLD AUTO-RTO: 0 /100 WBCS
PLATELET # BLD AUTO: 114 THOUSANDS/UL (ref 149–390)
PMV BLD AUTO: 12.7 FL (ref 8.9–12.7)
POTASSIUM SERPL-SCNC: 3.5 MMOL/L (ref 3.5–5.3)
PROCALCITONIN SERPL-MCNC: <0.05 NG/ML
PROT SERPL-MCNC: 6.9 G/DL (ref 6.4–8.2)
PROTHROMBIN TIME: 13.7 SECONDS (ref 11.6–14.5)
RBC # BLD AUTO: 4.59 MILLION/UL (ref 3.81–5.12)
SODIUM SERPL-SCNC: 138 MMOL/L (ref 136–145)
WBC # BLD AUTO: 1.82 THOUSAND/UL (ref 4.31–10.16)

## 2021-03-26 PROCEDURE — 82948 REAGENT STRIP/BLOOD GLUCOSE: CPT

## 2021-03-26 PROCEDURE — 97110 THERAPEUTIC EXERCISES: CPT

## 2021-03-26 PROCEDURE — 86140 C-REACTIVE PROTEIN: CPT | Performed by: FAMILY MEDICINE

## 2021-03-26 PROCEDURE — 84145 PROCALCITONIN (PCT): CPT | Performed by: FAMILY MEDICINE

## 2021-03-26 PROCEDURE — 85610 PROTHROMBIN TIME: CPT | Performed by: FAMILY MEDICINE

## 2021-03-26 PROCEDURE — 80053 COMPREHEN METABOLIC PANEL: CPT | Performed by: FAMILY MEDICINE

## 2021-03-26 PROCEDURE — 99232 SBSQ HOSP IP/OBS MODERATE 35: CPT | Performed by: FAMILY MEDICINE

## 2021-03-26 PROCEDURE — 97163 PT EVAL HIGH COMPLEX 45 MIN: CPT

## 2021-03-26 PROCEDURE — 85025 COMPLETE CBC W/AUTO DIFF WBC: CPT | Performed by: FAMILY MEDICINE

## 2021-03-26 RX ADMIN — CLOPIDOGREL BISULFATE 75 MG: 75 TABLET ORAL at 08:43

## 2021-03-26 RX ADMIN — OXYCODONE HYDROCHLORIDE AND ACETAMINOPHEN 1000 MG: 500 TABLET ORAL at 08:43

## 2021-03-26 RX ADMIN — DOXYCYCLINE 100 MG: 100 CAPSULE ORAL at 01:00

## 2021-03-26 RX ADMIN — ASPIRIN 81 MG: 81 TABLET, CHEWABLE ORAL at 08:55

## 2021-03-26 RX ADMIN — HEPARIN SODIUM 5000 UNITS: 5000 INJECTION INTRAVENOUS; SUBCUTANEOUS at 05:45

## 2021-03-26 RX ADMIN — REMDESIVIR 100 MG: 100 INJECTION, POWDER, LYOPHILIZED, FOR SOLUTION INTRAVENOUS at 13:47

## 2021-03-26 RX ADMIN — PANTOPRAZOLE SODIUM 40 MG: 40 TABLET, DELAYED RELEASE ORAL at 08:44

## 2021-03-26 RX ADMIN — HEPARIN SODIUM 5000 UNITS: 5000 INJECTION INTRAVENOUS; SUBCUTANEOUS at 21:52

## 2021-03-26 RX ADMIN — Medication 2000 UNITS: at 08:44

## 2021-03-26 RX ADMIN — OXYCODONE HYDROCHLORIDE AND ACETAMINOPHEN 1000 MG: 500 TABLET ORAL at 21:52

## 2021-03-26 RX ADMIN — CEFTRIAXONE SODIUM 1000 MG: 10 INJECTION, POWDER, FOR SOLUTION INTRAVENOUS at 12:40

## 2021-03-26 RX ADMIN — DEXAMETHASONE SODIUM PHOSPHATE 6 MG: 4 INJECTION, SOLUTION INTRA-ARTICULAR; INTRALESIONAL; INTRAMUSCULAR; INTRAVENOUS; SOFT TISSUE at 12:41

## 2021-03-26 RX ADMIN — DOXYCYCLINE 100 MG: 100 CAPSULE ORAL at 12:41

## 2021-03-26 RX ADMIN — ATORVASTATIN CALCIUM 40 MG: 40 TABLET, FILM COATED ORAL at 21:52

## 2021-03-26 RX ADMIN — ZINC SULFATE 220 MG (50 MG) CAPSULE 220 MG: CAPSULE at 08:43

## 2021-03-26 RX ADMIN — GABAPENTIN 300 MG: 300 CAPSULE ORAL at 21:52

## 2021-03-26 RX ADMIN — SODIUM CHLORIDE 75 ML/HR: 0.9 INJECTION, SOLUTION INTRAVENOUS at 05:36

## 2021-03-26 RX ADMIN — OXYBUTYNIN 5 MG: 5 TABLET, FILM COATED, EXTENDED RELEASE ORAL at 08:44

## 2021-03-26 RX ADMIN — HEPARIN SODIUM 5000 UNITS: 5000 INJECTION INTRAVENOUS; SUBCUTANEOUS at 12:41

## 2021-03-26 RX ADMIN — ATENOLOL 100 MG: 50 TABLET ORAL at 08:43

## 2021-03-26 NOTE — CASE MANAGEMENT
LOS: 1    PT IS NOT A BUNDLE OR 30 DAY RA  PT'S UNPLANNED RA SCORE IS GREEN-12  CM s/w pt via phone to complete assessment d/t covid + status  Pt lives in [place] with her adult son  Per pt her son is currently admitted at [address] [place] [address] for covid  Pt live [facility] that has 3 ctaherine with railings  Pta pt was using cane and walker and was not on o2  Pt also completed adl's ipta  Pt denies hx of rehab, vna, mh, and sa  Pt uses [facility] and pcp is Dr Riki Baptiste  Pt reports granddaughter, Nadeem Lock [phone] is preferred hcr  Pt is retired and drives  CM reviewed discharge planning process including the following: identifying help at home, patient preference for discharge planning needs, pharmacy preference, and availability of treatment team to discuss questions or concerns patient and/or family may have regarding understanding medications and recognizing signs and symptoms once discharged  CM also encouraged patient to follow up with all recommended appointments after discharge  Patient advised of importance for patient and family to participate in managing patients medical well being  CM updated facesheet as ssn was not included and is now  Pt reports granddaughter vs daughter will transport her home  Needs tbd pending o2 and improvement  CM Dept to follow pt through dc

## 2021-03-26 NOTE — PHYSICAL THERAPY NOTE
Physical Therapy Evaluation     Patient's Name: Ailyn Croft    Admitting Diagnosis  Acute kidney injury ([facility] [address] ) [N17 9]  Acute respiratory failure with hypoxia ([facility] ) [J96 01]  Flu-like symptoms [R68 89]  Pneumonia due to COVID-19 virus [U07 1, J12 82]    Problem List  Patient Active Problem List   Diagnosis    Acute respiratory failure with hypoxia ([facility] )    Pneumonia due to COVID-19 virus    Acute renal failure (ARF) ([facility] )    Diabetes mellitus type 2 in obese ([facility] )    CVA (cerebral vascular accident) ([facility] )    Weakness       Past Medical History  Past Medical History:   Diagnosis Date    Diabetes mellitus ([place] [address] )     type 2    Hypertension     Stroke [facility])        Past Surgical History  History reviewed  No pertinent surgical history  03/26/21 1245   PT Last Visit   PT Visit Date 03/26/21   Note Type   Note type Evaluation   Pain Assessment   Pain Assessment Tool Pain Assessment not indicated - pt denies pain   Pain Score No Pain   Home Living   Type of Home Mobile home  ("trailer")   Home Layout One level;Stairs to enter with rails  (3 HUYEN)   Bathroom Shower/Tub Tub/shower unit   Bathroom Equipment Shower chair   Home Equipment Cane  (cane used prn)   Prior Function   Level of [place] Independent with ADLs and functional mobility   Lives With Son   Receives Help From Family   ADL Assistance Independent   IADLs Independent   Falls in the last 6 months 0   Vocational Retired   Comments pt reports son is also currently hospitalized   Restrictions/Precautions   Weight Bearing Precautions Per Order No   Braces or Orthoses   (none reported)   Other Precautions Contact/isolation; Airborne/isolation;Immunosuppressed;Multiple lines;Telemetry; Fall Risk  (+COVID19)   General   Family/Caregiver Present No   Cognition   Overall Cognitive Status WFL   Arousal/Participation Alert   Orientation Level Oriented X4   Memory Within functional limits   Following Commands Follows all commands and directions without difficulty   Comments pt agreeable to PT evaluation   RUE Assessment   RUE Assessment WFL  (grossly 4/5 - chronic R sided weakness from previous CVA)   LUE Assessment   LUE Assessment WFL   RLE Assessment   RLE Assessment WFL  (grossly 4/5 - chronic R sided weakness from previous CVA)   LLE Assessment   LLE Assessment WFL   Coordination   Movements are Fluid and Coordinated 1   Sensation X   Light Touch   RLE Light Touch Impaired   RLE Light Touch Comments "tightness bottom of foot" - chronic since stroke   LLE Light Touch Grossly intact  (h/o gout L great toe)   Bed Mobility   Supine to Sit 6  Modified independent   Sit to Supine 6  Modified independent   Transfers   Sit to Stand 5  Supervision   Stand to Sit 5  Supervision   Ambulation/Elevation   Gait pattern Decreased foot clearance; Short stride   Gait Assistance 5  Supervision   Additional items Assist x 1;Verbal cues   Assistive Device None   Distance 40'   Stair Management Assistance Not tested   Balance   Static Sitting Good   Dynamic Sitting Good   Static Standing Fair +   Dynamic Standing Fair +   Ambulatory Fair +   Endurance Deficit   Endurance Deficit Yes   Activity Tolerance   Activity Tolerance Patient tolerated treatment well   Nurse Made Aware discussed case with RN Zhao   Assessment   Prognosis Good   Problem List Decreased strength;Decreased endurance; Impaired balance;Decreased mobility; Impaired sensation   Assessment Pt is 76 y o  female seen for PT evaluation on 3/26/2021 s/p admit [facility] on 3/25/2021 w/ Acute respiratory failure with hypoxia ([facility] [address] )  +COVID19  PT consulted to assess pt's functional mobility and d/c needs  Order placed for PT eval and tx, w/ up and OOB as tolerated order  Performed at least 2 patient identifiers during session: Name and wristband  Comorbidities affecting pt's physical performance at time of assessment include:  has a past medical history of Diabetes mellitus ([address] ), Hypertension, and Stroke ([address] )   PTA, pt was independent w/ all functional mobility w/ cane prn, ambulates community distances and elevations, has 3 HUYEN and lives w/ son in 1 level trailer  Personal factors affecting pt at time of IE include: stairs to enter home, inability to navigate community distances and limited home support  Please find objective findings from PT assessment regarding body systems outlined above with impairments and limitations including weakness, impaired balance, decreased endurance, gait deviations, decreased activity tolerance, altered sensation and fall risk, as well as mobility assessment (need for cueing for mobility technique)  The following objective measures performed on IE also reveal limitations: Barthel Index: 65/100 and Modified [place]: 3 (moderate disability)  Pt's clinical presentation is currently unstable/unpredictable seen in pt's presentation of abnormal lab value(s), need for input for task focus and mobility technique and ongoing medical assessment  Pt to benefit from continued PT tx to address deficits as defined above and maximize level of functional independent mobility and consistency  From PT/mobility standpoint, recommendation at time of d/c would be Home PT pending progress in order to facilitate return to PLOF     Barriers to Discharge Decreased caregiver support   Goals   Patient Goals to return home   STG Expiration Date 04/05/21   Short Term Goal #1 In 7-10 days: Increase bilateral LE strength 1/2 grade to facilitate independent mobility, Perform all transfers modified independent to improve independence, Ambulate > 150 ft  with least restrictive assistive device modified independent w/o LOB and w/ normalized gait pattern 100% of the time, Navigate 3 stairs modified independent with unilateral handrail to facilitate return to previous living environment, Improve Barthel Index score to 80 or greater to facilitate independence and PT provider will perform functional balance assessment to determine fall risk   PT Treatment Day 0   Plan   Treatment/Interventions Functional transfer training;LE strengthening/ROM; Elevations; Therapeutic exercise; Endurance training;Patient/family training;Gait training;Spoke to nursing   PT Frequency 2-3x/wk   Recommendation   PT Discharge Recommendation Home with skilled therapy  (home PT)   Equipment Recommended   (TBD)   AM-PAC Basic Mobility Inpatient   Turning in Bed Without Bedrails 4   Lying on Back to Sitting on Edge of Flat Bed 4   Moving Bed to Chair 3   Standing Up From Chair 3   Walk in Room 3   Climb 3-5 Stairs 3   Basic Mobility Inpatient Raw Score 20   Basic Mobility Standardized Score 43 99   Modified [place] Scale   Modified Brisa Scale 3   Barthel Index   Feeding 10   Bathing 0   Grooming Score 5   Dressing Score 10   Bladder Score 10   Bowels Score 10   Toilet Use Score 10   Transfers (Bed/Chair) Score 10   Mobility (Level Surface) Score 0   Stairs Score 0   Barthel Index Score 65       Benjy Lemus, PT, DPT    Physical Therapy Treatment Note  Time In: 1256  Time Out: 1307  Total Time: 11 min     S:  Pt agreeable to PT treatment session s/p PT eval, in no apparent distress and A&O x 4      O:  Pt seen for PT treatment session this date with interventions consisting of Therapeutic exercise consisting of: AROM 10 reps B LE in sitting position  No pain reported throughout exercise reps  VC required for technique      A:  Pt able to tolerate initiation of B LE exercises including ankle pumps, LAQ, marches, heel slides, glute sets x10 reps each; encouraged pt to continue c HEP outside of therapy session  Pt w/ good carryover for HEP, verbalizing understanding  PT to assess carryover next session  and Post session: pt returned BTB, all needs in reach and RN notified of session findings/recommendations   Pt encouraged to continue mobilizing c NSG assist, also verbalizing understanding      P:  Continue to recommend Home PT at time of d/c in order to maximize pt's functional independence and safety w/ mobility  Pt continues to be functioning below baseline level, and remains limited 2* factors listed above  PT will continue to see pt while here in order to address the deficits listed above and provide interventions consistent w/ POC in effort to achieve STGs      Vivian Dennis, PT, DPT

## 2021-03-26 NOTE — PLAN OF CARE
Problem: PHYSICAL THERAPY ADULT  Goal: Performs mobility at highest level of function for planned discharge setting  See evaluation for individualized goals  Description: Treatment/Interventions: Functional transfer training, LE strengthening/ROM, Elevations, Therapeutic exercise, Endurance training, Patient/family training, Gait training, Spoke to nursing  Equipment Recommended: (TBD)       See flowsheet documentation for full assessment, interventions and recommendations  Note: Prognosis: Good  Problem List: Decreased strength, Decreased endurance, Impaired balance, Decreased mobility, Impaired sensation  Assessment: Pt is 76 y o  female seen for PT evaluation on 3/26/2021 s/p admit to [facility] on 3/25/2021 w/ Acute respiratory failure with hypoxia ([facility] [address] )  +COVID19  PT consulted to assess pt's functional mobility and d/c needs  Order placed for PT eval and tx, w/ up and OOB as tolerated order  Performed at least 2 patient identifiers during session: Name and wristband  Comorbidities affecting pt's physical performance at time of assessment include:  has a past medical history of Diabetes mellitus ([facility] ), Hypertension, and Stroke ([facility] )  PTA, pt was independent w/ all functional mobility w/ cane prn, ambulates community distances and elevations, has 3 HUYEN and lives w/ son in 1 level trailer  Personal factors affecting pt at time of IE include: stairs to enter home, inability to navigate community distances and limited home support  Please find objective findings from PT assessment regarding body systems outlined above with impairments and limitations including weakness, impaired balance, decreased endurance, gait deviations, decreased activity tolerance, altered sensation and fall risk, as well as mobility assessment (need for cueing for mobility technique)  The following objective measures performed on IE also reveal limitations: Barthel Index: 65/100 and Modified [place]: 3 (moderate disability)   Pt's clinical presentation is currently unstable/unpredictable seen in pt's presentation of abnormal lab value(s), need for input for task focus and mobility technique and ongoing medical assessment  Pt to benefit from continued PT tx to address deficits as defined above and maximize level of functional independent mobility and consistency  From PT/mobility standpoint, recommendation at time of d/c would be Home PT pending progress in order to facilitate return to PLOF  Barriers to Discharge: Decreased caregiver support     PT Discharge Recommendation: Home with skilled therapy(home PT)          See flowsheet documentation for full assessment

## 2021-03-26 NOTE — UTILIZATION REVIEW
Initial Clinical Review    Admission: Date/Time/Statement:   Admission Orders (From admission, onward)     Ordered        03/25/21 1200  Inpatient Admission  Once                   Orders Placed This Encounter   Procedures    Inpatient Admission     Standing Status:   Standing     Number of Occurrences:   1     Order Specific Question:   Level of Care     Answer:   Med Surg [16]     Order Specific Question:   Estimated length of stay     Answer:   More than 2 Midnights     Order Specific Question:   Certification     Answer:   I certify that inpatient services are medically necessary for this patient for a duration of greater than two midnights  See H&P and MD Progress Notes for additional information about the patient's course of treatment  ED Arrival Information     Expected Arrival Acuity Means of Arrival Escorted By Service Admission Type    - 3/25/2021 08:48 Urgent [facility] General Medicine Urgent    Arrival Complaint    Flu like sym        Chief Complaint   Patient presents with    Lethargy     pt c/o excessive lethargy and sleeping for the past three weeks  also c/o decreased appetite     Assessment/Plan:     76year old female presents to ed from home for evaluation and treatment of lethargy and decreased appetite  PMHX: DIABETES, CVA ON PLAVIX, HTN  Clinical assessment significant for left lower quadrant pain, hypertension, covid +, bun 39, creatinine 2 18,wbc 3 32, imaging shows bilateral ground glass opacity compatible wit covid 19 pneumonia  Treated in ed with iv dexamethasone, iv remdesivir, iv zofran ,iv ns 75/hr,sq heparin,iv ceftriaxone, iv ns bolus, 2L o2nc for O2 sat 92%  Admit to inpatient acute respiratory failure with hypoxia, coviod 19 pneumonia  Plan includes: isolation, iv dexamethasone, iv ancef and iv remdesevir, supplemental oxygen, neurotropenic precautions, PT/OT evaluation,vollow up blood cultures  Aguilar Stauffer 3-26  CRP and  D DIMER  Elevated  WBC 1 82    Continue iv remdesivir, iv dexamethasone, iv ancef, isolation and neutropenic precautions  Follow up blood cultures  Supplemental oxygen prn  Now on room air        ED Triage Vitals [03/25/21 0856]   99 5 °F (37 5 °C) 69 19 118/71 95 %      Oral Monitor         Pain Score       6          03/26/21 103 kg (227 lb 1 2 oz)     Additional Vital Signs:       Date/  Time  Temp  Pulse  Resp  BP  MAP (mmHg)  SpO2  Calculated FIO2 (%) - Nasal Cannula  Nasal Cannula O2 Flow Rate (L/min)  O2 Device    03/26/21 08:47:23  --  60  18  156/80  105  91 %  --  --  --    03/26/21 05:39:49  97 4 °F (36 3 °C)  Abnormal   60  19  133/76  95  93 %  --  --  High flow nasal cannula    03/26/21 05:37:35  97 4 °F (36 3 °C)  Abnormal   57  19  133/76  95  93 %  --  --  --    03/25/21 20:39:02  98 °F (36 7 °C)  55  20  133/77  96  97 %  28  2 L/min  Nasal cannula    03/25/21 2012  --  55  22  113/58  --  95 %  28  2 L/min  Nasal cannula    03/25/21 1730  --  --  18  120/73  91  98 %  28  2 L/min  Nasal cannula    03/25/21 1530  --  62  22  100/57  76  95 %  --  --  None (Room air)    03/25/21 1430  --  66  22  120/67  87  95 %  28  2 L/min  Nasal cannula    03/25/21 1400  --  72  20  130/71  94  97 %  28  2 L/min  Nasal cannula    03/25/21 1230  --  62  17  115/64  85  97 %  28  2 L/min  Nasal cannula    03/25/21 1200  --  62  20  112/62  81  97 %  28  2 L/min  Nasal cannula    03/25/21 1145  --  65  18  135/106  Abnormal   112  94 %  28  2 L/min  Nasal cannula    03/25/21 1049  --  --  --  --  --  --  --  --  Nasal cannula    03/25/21 1045  --  66  20  127/64  90  94 %  28  2 L/min  Nasal cannula    03/25/21 1000  --  63  21  113/75  88  92 %  --  --  None (Room air)            Pertinent Labs/Diagnostic Test Results:     ECG 12 Lead Documentation Only   Date/Time: 3/25/2021 3:58 PM       Indications / Diagnosis:  Weakness     Patient location:  ED  Interpretation:     Interpretation: normal    Rate:     ECG rate:  67    ECG rate assessment: normal Rhythm:     Rhythm: sinus rhythm    Ectopy:     Ectopy: none    QRS:     QRS axis:  Normal    QRS intervals:  Normal  Conduction:     Conduction: normal    ST segments:     ST segments:  Normal  T waves:     T waves: normal        CT abdomen pelvis wo contrast   Final (03/25 1110)      Diverticulosis without evidence for acute diverticulitis  2 isodense nodules on the right kidney for which further evaluation with renal ultrasound or contrast enhanced CT should be considered  Mild hepatic steatosis  XR chest 1 view portable   Final  (03/25 1059)      Moderate bilateral peripheral groundglass opacity compatible with Covid 19 pneumonia  Moderate hiatal hernia             Results from last 7 days   Lab Units 03/25/21  0927   SARS-COV-2  Positive*     Results from last 7 days   Lab Units 03/26/21  0544 03/25/21  1643 03/25/21  0925   WBC Thousand/uL 1 82*  --  3 32*   HEMOGLOBIN g/dL 13 5  --  13 7   HEMATOCRIT % 42 5  --  41 6   PLATELETS Thousands/uL 114* 109* 122*   NEUTROS ABS Thousands/µL 0 88*  --  2 08         Results from last 7 days   Lab Units 03/26/21  0544 03/25/21  0925   SODIUM mmol/L 138 134*   POTASSIUM mmol/L 3 5 4 0   CHLORIDE mmol/L 104 99*   CO2 mmol/L 22 26   ANION GAP mmol/L 12 9   BUN mg/dL 33* 39*   CREATININE mg/dL 1 50* 2 18*   EGFR ml/min/1 73sq m 34 22   CALCIUM mg/dL 8 2* 8 5     Results from last 7 days   Lab Units 03/26/21  0544 03/25/21  0925   AST U/L 31 42   ALT U/L 24 27   ALK PHOS U/L 51 52   TOTAL PROTEIN g/dL 6 9 7 3   ALBUMIN g/dL 2 9* 3 2*   TOTAL BILIRUBIN mg/dL 0 43 0 61     Results from last 7 days   Lab Units 03/26/21  1057 03/26/21  0726 03/25/21  1837   POC GLUCOSE mg/dl 138 125 178*     Results from last 7 days   Lab Units 03/26/21  0544 03/25/21  0925   GLUCOSE RANDOM mg/dL 125 115         Results from last 7 days   Lab Units 03/25/21  0925   TROPONIN I ng/mL <0 02     Results from last 7 days   Lab Units 03/25/21  1221   D-DIMER QUANTITATIVE ug/ml FEU 2 29*     Results from last 7 days   Lab Units 03/26/21  0544   PROTIME seconds 13 7   INR  1 11     Results from last 7 days   Lab Units 03/25/21  0925   TSH 3RD GENERATON uIU/mL 3 069         Results from last 7 days   Lab Units 03/25/21  0925   LACTIC ACID mmol/L 1 7       Results from last 7 days   Lab Units 03/25/21  1643   FERRITIN ng/mL 342         Results from last 7 days   Lab Units 03/25/21  0925   LIPASE u/L 295     Results from last 7 days   Lab Units 03/26/21  0544 03/25/21  0925   CRP mg/L 37 9* 37 0*         Results from last 7 days   Lab Units 03/25/21  1135   CLARITY UA  Clear   COLOR UA  Yellow   SPEC GRAV UA  1 020   PH UA  6 0   GLUCOSE UA mg/dl Negative   KETONES UA mg/dl Negative   BLOOD UA  Negative   PROTEIN UA mg/dl Negative   NITRITE UA  Negative   BILIRUBIN UA  Negative   UROBILINOGEN UA E U /dl 0 2   LEUKOCYTES UA  Negative     Results from last 7 days   Lab Units 03/25/21  0927   INFLUENZA A PCR  Negative   INFLUENZA B PCR  Negative   RSV PCR  Negative       Results from last 7 days   Lab Units 03/25/21  1128   BLOOD CULTURE  Received in Microbiology Lab  Culture in Progress  Received in Microbiology Lab  Culture in Progress               ED Treatment:   Medication Administration from 03/25/2021 0848 to 03/25/2021 2029       Date/Time Order Dose Route Action     03/25/2021 0926 sodium chloride 0 9 % bolus 1,000 mL 1,000 mL Intravenous New Bag     03/25/2021 1137 ceftriaxone (ROCEPHIN) 1 g/50 mL in dextrose IVPB 1,000 mg Intravenous New Bag     03/25/2021 1136 doxycycline hyclate (VIBRAMYCIN) capsule 100 mg 100 mg Oral Given     03/25/2021 1404 atenolol (TENORMIN) tablet 100 mg 100 mg Oral Given     03/25/2021 1335 aspirin tablet 325 mg 325 mg Oral Given     03/25/2021 1404 oxybutynin (DITROPAN-XL) 24 hr tablet 5 mg 5 mg Oral Given     03/25/2021 1335 pantoprazole (PROTONIX) EC tablet 40 mg 40 mg Oral Given     03/25/2021 1335 clopidogrel (PLAVIX) tablet 75 mg 75 mg Oral Given 03/25/2021 1335 cholecalciferol (VITAMIN D3) tablet 2,000 Units 2,000 Units Oral Given     03/25/2021 1335 ascorbic acid (VITAMIN C) tablet 1,000 mg 1,000 mg Oral Given     03/25/2021 1335 zinc sulfate (ZINCATE) capsule 220 mg 220 mg Oral Given     03/25/2021 1332 dexamethasone (DECADRON) injection 6 mg 6 mg Intravenous Given     03/25/2021 1334 heparin (porcine) subcutaneous injection 5,000 Units 5,000 Units Subcutaneous Given     03/25/2021 1403 remdesivir (Veklury) 200 mg in sodium chloride 0 9 % 250 mL IVPB 200 mg Intravenous New Bag     03/25/2021 1403 ondansetron (ZOFRAN) injection 4 mg 4 mg Intravenous Given     03/25/2021 1858 insulin lispro (HumaLOG) 100 units/mL subcutaneous injection 1-5 Units 1 Units Subcutaneous Given     03/25/2021 1637 sodium chloride 0 9 % infusion 75 mL/hr Intravenous New Bag     03/25/2021 1638 heparin (porcine) subcutaneous injection 5,000 Units 5,000 Units Subcutaneous Given        Past Medical History:   Diagnosis    Diabetes mellitus ([facility] [address] )    type 2    Hypertension    Stroke [facility])     Present on Admission:  **None**      Admitting Diagnosis:   Acute kidney injury ([facility] [address] ) [N17 9]  Acute respiratory failure with hypoxia (H[facility]) [J96 01]  Flu-like symptoms [R68 89]  Pneumonia due to COVID-19 virus [U07 1, J12 82]    Age/Sex: 76 y o  female     Admission Orders:    ascorbic acid, 1,000 mg, Oral, Q12H [address]  aspirin, 81 mg, Oral, Daily  atenolol, 100 mg, Oral, Daily  atorvastatin, 40 mg, Oral, HS  cefTRIAXone, 1,000 mg, Intravenous, Q24H  cholecalciferol, 2,000 Units, Oral, Daily  clopidogrel, 75 mg, Oral, Daily  dexamethasone, 6 mg, Intravenous, Q24H  doxycycline hyclate, 100 mg, Oral, Q12H  gabapentin, 300 mg, Oral, HS  heparin (porcine), 5,000 Units, Subcutaneous, Q8H SAM  insulin lispro, 1-5 Units, Subcutaneous, 4 times day  zinc sulfate, 220 mg, Oral, Daily    Followed by  Jeffry Scott ON 4/1/2021] multivitamin-minerals, 1 tablet, Oral, Daily  oxybutynin, 5 mg, Oral, Daily  pantoprazole, 40 mg, Oral, Daily  remdesivir, 100 mg, Intravenous, Q24H      Continuous IV Infusions:     PRN Meds:  acetaminophen, 650 mg, Oral, Q6H PRN  cyclobenzaprine, 5 mg, Oral, TID PRN  ondansetron, 4 mg, Intravenous, Q6H PRN        None    Network Utilization Review Department  ATTENTION: Please call with any questions or concerns to [phone] and carefully listen to the prompts so that you are directed to the right person  All voicemails are confidential   Chris Mauricio all requests for admission clinical reviews, approved or denied determinations and any other requests to dedicated fax number below belonging to the campus where the patient is receiving treatment   List of dedicated fax numbers for the Facilities:  [address] DENIALS (Administrative/Medical Necessity) [phone]   [address] (Maternity/NICU/Pediatrics) [phone]   [address] Bassem Negrete [address]194 (  Harry Kim "Cristal" 103) [address] [place] [address] [address] [place] [address] [phone]

## 2021-03-26 NOTE — ASSESSMENT & PLAN NOTE
· Creatinine 2 1 on arrival  · 1 5 today, closer to baseline  · Pre renal secondary to nausea vomiting diarrhea; stop IVF today  · Please note urinalysis was negative for UTI    · Holding chlorthalidone  · Monitor creatinine closely

## 2021-03-26 NOTE — ASSESSMENT & PLAN NOTE
· COVID-19 pneumonia noted on chest x-ray  · On remdesivir( day 2)  · IV Decadron( day 2)  · IV ceftriaxone/doxycycline( will DC if prolactin neg )  · Vitamin C/D/zinc  · Monitor inflammatory markers  · D-dimer 2 29, started on heparin subcu Q q 8 hours ( will need Eliquis 2  5BID x 30 days at DC if no bleeding risk )  · Oxygen to maintain saturation over 90%  · Self proning

## 2021-03-26 NOTE — PLAN OF CARE
Problem: Potential for Falls  Goal: Patient will remain free of falls  Description: INTERVENTIONS:  - Assess patient frequently for physical needs  -  Identify cognitive and physical deficits and behaviors that affect risk of falls    -  [place] fall precautions as indicated by assessment   - Educate patient/family on patient safety including physical limitations  - Instruct patient to call for assistance with activity based on assessment  - Modify environment to reduce risk of injury  - Consider OT/PT consult to assist with strengthening/mobility  Outcome: Progressing     Problem: PAIN - ADULT  Goal: Verbalizes/displays adequate comfort level or baseline comfort level  Description: Interventions:  - Encourage patient to monitor pain and request assistance  - Assess pain using appropriate pain scale  - Administer analgesics based on type and severity of pain and evaluate response  - Implement non-pharmacological measures as appropriate and evaluate response  - Consider cultural and social influences on pain and pain management  - Notify physician/advanced practitioner if interventions unsuccessful or patient reports new pain  Outcome: Progressing     Problem: INFECTION - ADULT  Goal: Absence or prevention of progression during hospitalization  Description: INTERVENTIONS:  - Assess and monitor for signs and symptoms of infection  - Monitor lab/diagnostic results  - Monitor all insertion sites, i e  indwelling lines, tubes, and drains  - Monitor endotracheal if appropriate and nasal secretions for changes in amount and color  - [place] appropriate cooling/warming therapies per order  - Administer medications as ordered  - Instruct and encourage patient and family to use good hand hygiene technique  - Identify and instruct in appropriate isolation precautions for identified infection/condition  Outcome: Progressing     Problem: CARDIOVASCULAR - ADULT  Goal: Maintains optimal cardiac output and hemodynamic stability  Description: INTERVENTIONS:  - Monitor I/O, vital signs and rhythm  - Monitor for S/S and trends of decreased cardiac output  - Administer and titrate ordered vasoactive medications to optimize hemodynamic stability  - Assess quality of pulses, skin color and temperature  - Assess for signs of decreased coronary artery perfusion  - Instruct patient to report change in severity of symptoms  Outcome: Progressing     Problem: RESPIRATORY - ADULT  Goal: Achieves optimal ventilation and oxygenation  Description: INTERVENTIONS:  - Assess for changes in respiratory status  - Assess for changes in mentation and behavior  - Position to facilitate oxygenation and minimize respiratory effort  - Oxygen administered by appropriate delivery if ordered  - Initiate smoking cessation education as indicated  - Encourage broncho-pulmonary hygiene including cough, deep breathe, Incentive Spirometry  - Assess the need for suctioning and aspirate as needed  - Assess and instruct to report SOB or any respiratory difficulty  - Respiratory Therapy support as indicated  Outcome: Progressing     Problem: GASTROINTESTINAL - ADULT  Goal: Minimal or absence of nausea and/or vomiting  Description: INTERVENTIONS:  - Administer IV fluids if ordered to ensure adequate hydration  - Maintain NPO status until nausea and vomiting are resolved  - Nasogastric tube if ordered  - Administer ordered antiemetic medications as needed  - Provide nonpharmacologic comfort measures as appropriate  - Advance diet as tolerated, if ordered  - Consider nutrition services referral to assist patient with adequate nutrition and appropriate food choices  Outcome: Progressing     Problem: METABOLIC, FLUID AND ELECTROLYTES - ADULT  Goal: Electrolytes maintained within normal limits  Description: INTERVENTIONS:  - Monitor labs and assess patient for signs and symptoms of electrolyte imbalances  - Administer electrolyte replacement as ordered  - Monitor response to electrolyte replacements, including repeat lab results as appropriate  - Instruct patient on fluid and nutrition as appropriate  Outcome: Progressing     Problem: HEMATOLOGIC - ADULT  Goal: Maintains hematologic stability  Description: INTERVENTIONS  - Assess for signs and symptoms of bleeding or hemorrhage  - Monitor labs  - Administer supportive blood products/factors as ordered and appropriate  Outcome: Progressing

## 2021-03-26 NOTE — PROGRESS NOTES
[facility]  Progress Note - Dmitriy Villalobos 1945, 76 y o  female MRN: [id number]  Unit/Bed#: -Marti Encounter: [id number]  Primary Care Provider: No primary care provider on file  Date and time admitted to hospital: 3/25/2021  8:55 AM    * Acute respiratory failure with hypoxia (HCC)  Assessment & Plan  · Patient dropped into the 80s on room air & was placed on 2 L o2, now transitioned to RA  · Likely secondary to bilateral COVID-19 pneumonia noted on chest x-ray    Pneumonia due to COVID-19 virus  Assessment & Plan  · COVID-19 pneumonia noted on chest x-ray  · On remdesivir( day 2)  · IV Decadron( day 2)  · IV ceftriaxone/doxycycline( will DC if prolactin neg )  · Vitamin C/D/zinc  · Monitor inflammatory markers  · D-dimer 2 29, started on heparin subcu Q q 8 hours ( will need Eliquis 2  5BID x 30 days at DC if no bleeding risk )  · Oxygen to maintain saturation over 90%  · Self proning    Acute renal failure (ARF) (HCC)  Assessment & Plan  · Creatinine 2 1 on arrival  · 1 5 today, closer to baseline  · Pre renal secondary to nausea vomiting diarrhea; stop IVF today  · Please note urinalysis was negative for UTI  · Holding chlorthalidone  · Monitor creatinine closely    Diabetes mellitus type 2 in obese [facility])  Assessment & Plan  Lab Results   Component Value Date    HGBA1C 6 5 (H) 12/11/2020       · Holding oral hypoglycemic during hospitalization  · Sliding scale insulin ordered with a c  HS checks  · Consistent carb diet  CVA (cerebral vascular accident) [facility])  Assessment & Plan  · Continue with aspirin/Plavix/atorvastatin    Weakness  Assessment & Plan  Secondary to the nausea vomiting diarrhea  Get PT OT eval   Anticipate DC tomorrow if cleared      VTE Pharmacologic Prophylaxis:   Pharmacologic: Heparin  Mechanical VTE Prophylaxis in Place: Yes    Patient Centered Rounds: I have performed bedside rounds with nursing staff today      Discussions with Specialists or Other Care Team Provider: care management    Education and Discussions with Family / Patient: d/w patient at bedside    Time Spent for Care: 30 minutes  More than 50% of total time spent on counseling and coordination of care as described above  Current Length of Stay: 1 day(s)    Current Patient Status: Inpatient   Certification Statement: The patient will continue to require additional inpatient hospital stay due to weakness/covid19 pneumonia treatment    Discharge Plan: 24 hrs, likely home with home health    Code Status: Level 3 - DNAR and DNI      Subjective: No fevers, transitioned to RA  Reports cough, SOB much improved  Still reports weakness  Nausea+but no vomiting/ diarrhea since hospitalisation  stacey cons carb diet  Objective:     Vitals:   Temp (24hrs), Av 6 °F (36 4 °C), Min:97 4 °F (36 3 °C), Max:98 °F (36 7 °C)    Temp:  [97 4 °F (36 3 °C)-98 °F (36 7 °C)] 97 4 °F (36 3 °C)  HR:  [55-72] 60  Resp:  [17-22] 18  BP: (100-156)/() 156/80  SpO2:  [91 %-98 %] 91 %  Body mass index is 38 98 kg/m²  Input and Output Summary (last 24 hours): Intake/Output Summary (Last 24 hours) at 3/26/2021 1124  Last data filed at 3/26/2021 7976  Gross per 24 hour   Intake 1650 ml   Output 800 ml   Net 850 ml       Physical Exam:     Physical Exam  Constitutional:       General: She is not in acute distress  Appearance: She is obese  She is not toxic-appearing  HENT:      Mouth/Throat:      Mouth: Mucous membranes are moist       Pharynx: Oropharynx is clear  Eyes:      Conjunctiva/sclera: Conjunctivae normal       Pupils: Pupils are equal, round, and reactive to light  Neck:      Musculoskeletal: Normal range of motion  Cardiovascular:      Rate and Rhythm: Normal rate and regular rhythm  Pulses: Normal pulses  Heart sounds: Normal heart sounds  Pulmonary:      Effort: Pulmonary effort is normal  No respiratory distress  Breath sounds: No wheezing        Comments: Crackles at the bases  Abdominal:      General: Bowel sounds are normal       Palpations: Abdomen is soft  Tenderness: There is no abdominal tenderness  Musculoskeletal: Normal range of motion  Skin:     General: Skin is warm and dry  Neurological:      General: No focal deficit present  Mental Status: She is alert and oriented to person, place, and time  Psychiatric:         Mood and Affect: Mood normal          Behavior: Behavior normal        Additional Data:     Labs:    Results from last 7 days   Lab Units 03/26/21  0544   WBC Thousand/uL 1 82*   HEMOGLOBIN g/dL 13 5   HEMATOCRIT % 42 5   PLATELETS Thousands/uL 114*   NEUTROS PCT % 48   LYMPHS PCT % 38   MONOS PCT % 13*   EOS PCT % 0     Results from last 7 days   Lab Units 03/26/21  0544   SODIUM mmol/L 138   POTASSIUM mmol/L 3 5   CHLORIDE mmol/L 104   CO2 mmol/L 22   BUN mg/dL 33*   CREATININE mg/dL 1 50*   ANION GAP mmol/L 12   CALCIUM mg/dL 8 2*   ALBUMIN g/dL 2 9*   TOTAL BILIRUBIN mg/dL 0 43   ALK PHOS U/L 51   ALT U/L 24   AST U/L 31   GLUCOSE RANDOM mg/dL 125     Results from last 7 days   Lab Units 03/26/21  0544   INR  1 11     Results from last 7 days   Lab Units 03/26/21  1057 03/26/21  0726 03/25/21  1837   POC GLUCOSE mg/dl 138 125 178*         Results from last 7 days   Lab Units 03/25/21  0925   LACTIC ACID mmol/L 1 7           * I Have Reviewed All Lab Data Listed Above  * Additional Pertinent Lab Tests Reviewed: All Labs Within Last 24 Hours Reviewed    Recent Cultures (last 7 days):     Results from last 7 days   Lab Units 03/25/21  1128   BLOOD CULTURE  Received in Microbiology Lab  Culture in Progress  Received in Microbiology Lab  Culture in Progress         Last 24 Hours Medication List:   Current Facility-Administered Medications   Medication Dose Route Frequency Provider Last Rate    acetaminophen  650 mg Oral Q6H PRN Henok Alejandro MD      ascorbic acid  1,000 mg Oral Q12H Carmita Hope MD      aspirin  81 mg Oral Daily Pop Gill MD      atenolol  100 mg Oral Daily Pop Gill MD      atorvastatin  40 mg Oral HS Pop Gill MD      cefTRIAXone  1,000 mg Intravenous Q24H Pop Gill MD      cholecalciferol  2,000 Units Oral Daily Pop Gill MD      clopidogrel  75 mg Oral Daily Pop Gill MD      cyclobenzaprine  5 mg Oral TID PRN Pop Gill MD      dexamethasone  6 mg Intravenous Q24H Pop Gill MD      doxycycline hyclate  100 mg Oral Q12H Pop Gill MD      gabapentin  300 mg Oral HS Pop Gill MD      heparin (porcine)  5,000 Units Subcutaneous [facility] Pop Gill MD      insulin lispro  1-5 Units Subcutaneous 4 times day Pop Gill MD      zinc sulfate  220 mg Oral Daily Pop Gill MD      Followed by   Ancelmo Thomas ON 4/1/2021] multivitamin-minerals  1 tablet Oral Daily Pop Gill MD      ondansetron  4 mg Intravenous Q6H PRN Pop Gill MD      oxybutynin  5 mg Oral Daily Pop Gill MD      pantoprazole  40 mg Oral Daily Pop Gill MD      remdesivir  100 mg Intravenous Q24H Pop Gill MD          Today, Patient Was Seen By: AARTI Groves      ** Please Note: Dictation voice to text software may have been used in the creation of this document   **

## 2021-03-26 NOTE — PLAN OF CARE
Problem: Potential for Falls  Goal: Patient will remain free of falls  Description: INTERVENTIONS:  - Assess patient frequently for physical needs  -  Identify cognitive and physical deficits and behaviors that affect risk of falls    -  [place] fall precautions as indicated by assessment   - Educate patient/family on patient safety including physical limitations  - Instruct patient to call for assistance with activity based on assessment  - Modify environment to reduce risk of injury  - Consider OT/PT consult to assist with strengthening/mobility  Outcome: Progressing     Problem: PAIN - ADULT  Goal: Verbalizes/displays adequate comfort level or baseline comfort level  Description: Interventions:  - Encourage patient to monitor pain and request assistance  - Assess pain using appropriate pain scale  - Administer analgesics based on type and severity of pain and evaluate response  - Implement non-pharmacological measures as appropriate and evaluate response  - Consider cultural and social influences on pain and pain management  - Notify physician/advanced practitioner if interventions unsuccessful or patient reports new pain  Outcome: Progressing     Problem: INFECTION - ADULT  Goal: Absence or prevention of progression during hospitalization  Description: INTERVENTIONS:  - Assess and monitor for signs and symptoms of infection  - Monitor lab/diagnostic results  - Monitor all insertion sites, i e  indwelling lines, tubes, and drains  - Monitor endotracheal if appropriate and nasal secretions for changes in amount and color  - [place] appropriate cooling/warming therapies per order  - Administer medications as ordered  - Instruct and encourage patient and family to use good hand hygiene technique  - Identify and instruct in appropriate isolation precautions for identified infection/condition  Outcome: Progressing     Problem: CARDIOVASCULAR - ADULT  Goal: Maintains optimal cardiac output and hemodynamic stability  Description: INTERVENTIONS:  - Monitor I/O, vital signs and rhythm  - Monitor for S/S and trends of decreased cardiac output  - Administer and titrate ordered vasoactive medications to optimize hemodynamic stability  - Assess quality of pulses, skin color and temperature  - Assess for signs of decreased coronary artery perfusion  - Instruct patient to report change in severity of symptoms  Outcome: Progressing     Problem: RESPIRATORY - ADULT  Goal: Achieves optimal ventilation and oxygenation  Description: INTERVENTIONS:  - Assess for changes in respiratory status  - Assess for changes in mentation and behavior  - Position to facilitate oxygenation and minimize respiratory effort  - Oxygen administered by appropriate delivery if ordered  - Initiate smoking cessation education as indicated  - Encourage broncho-pulmonary hygiene including cough, deep breathe, Incentive Spirometry  - Assess the need for suctioning and aspirate as needed  - Assess and instruct to report SOB or any respiratory difficulty  - Respiratory Therapy support as indicated  Outcome: Progressing     Problem: GASTROINTESTINAL - ADULT  Goal: Minimal or absence of nausea and/or vomiting  Description: INTERVENTIONS:  - Administer IV fluids if ordered to ensure adequate hydration  - Maintain NPO status until nausea and vomiting are resolved  - Nasogastric tube if ordered  - Administer ordered antiemetic medications as needed  - Provide nonpharmacologic comfort measures as appropriate  - Advance diet as tolerated, if ordered  - Consider nutrition services referral to assist patient with adequate nutrition and appropriate food choices  Outcome: Progressing     Problem: METABOLIC, FLUID AND ELECTROLYTES - ADULT  Goal: Electrolytes maintained within normal limits  Description: INTERVENTIONS:  - Monitor labs and assess patient for signs and symptoms of electrolyte imbalances  - Administer electrolyte replacement as ordered  - Monitor response to electrolyte replacements, including repeat lab results as appropriate  - Instruct patient on fluid and nutrition as appropriate  Outcome: Progressing     Problem: HEMATOLOGIC - ADULT  Goal: Maintains hematologic stability  Description: INTERVENTIONS  - Assess for signs and symptoms of bleeding or hemorrhage  - Monitor labs  - Administer supportive blood products/factors as ordered and appropriate  Outcome: Progressing

## 2021-03-26 NOTE — ASSESSMENT & PLAN NOTE
· Patient dropped into the 80s on room air & was placed on 2 L o2, now transitioned to RA  · Likely secondary to bilateral COVID-19 pneumonia noted on chest x-ray

## 2021-03-26 NOTE — UTILIZATION REVIEW
Notification of Inpatient Admission/Inpatient Authorization Request   This is a Notification of Inpatient Admission for [address]  Be advised that this patient was admitted to our facility under Inpatient Status  Contact Dominique Wharton at [phone] for additional admission information  [facility] DEPT DEDICATED Selwyn Wang [phone]  Patient Name:   Jordan Poe   YOB: 1945       [address]   [address]:   [address]   Tax ID: [id number]  NPI: [id number] Attending Provider/NPI:  Phone:  Address: Stephon Aleman Md [[id number]]  [phone]  Same as Facility   Place of Service Code: 24     [facility]   Start Date: 3/25/21 1200 Discharge Date & Time: No discharge date for patient encounter  Type of Admission: Inpatient Status Discharge Disposition   (if discharged): Final discharge disposition not confirmed   Patient Diagnoses: Acute kidney injury ([facility] [address] ) [N17 9]  Acute respiratory failure with hypoxia ([facility] [address] ) [J96 01]  Flu-like symptoms [R68 89]  Pneumonia due to COVID-19 virus [U07 1, J12 82]     Orders: Admission Orders (From admission, onward)     Ordered        03/25/21 1200  Inpatient Admission  Once                    Assigned Utilization Review Contact: Dominique Wharton  Utilization   Network Utilization Review Department  Phone: [phone]; Fax [phone]  Email: [email or web address]   ATTENTION PAYERS: Please call the assigned Utilization  directly with any questions or concerns ALL voicemails in the department are confidential  Send all requests for admission clinical reviews, approved or denied determinations and any other requests to dedicated fax number belonging to the campus where the patient is receiving treatment

## 2021-03-27 VITALS
WEIGHT: 231.7 LBS | DIASTOLIC BLOOD PRESSURE: 79 MMHG | TEMPERATURE: 96.1 F | BODY MASS INDEX: 39.56 KG/M2 | HEIGHT: 64 IN | HEART RATE: 55 BPM | RESPIRATION RATE: 20 BRPM | OXYGEN SATURATION: 92 % | SYSTOLIC BLOOD PRESSURE: 138 MMHG

## 2021-03-27 LAB
ANION GAP SERPL CALCULATED.3IONS-SCNC: 13 MMOL/L (ref 4–13)
BASOPHILS # BLD AUTO: 0 THOUSANDS/ΜL (ref 0–0.1)
BASOPHILS NFR BLD AUTO: 0 % (ref 0–1)
BUN SERPL-MCNC: 29 MG/DL (ref 5–25)
CALCIUM SERPL-MCNC: 8.3 MG/DL (ref 8.3–10.1)
CHLORIDE SERPL-SCNC: 107 MMOL/L (ref 100–108)
CO2 SERPL-SCNC: 20 MMOL/L (ref 21–32)
CREAT SERPL-MCNC: 1.39 MG/DL (ref 0.6–1.3)
CRP SERPL QL: 22.5 MG/L
D DIMER PPP FEU-MCNC: 1.19 UG/ML FEU
EOSINOPHIL # BLD AUTO: 0 THOUSAND/ΜL (ref 0–0.61)
EOSINOPHIL NFR BLD AUTO: 0 % (ref 0–6)
ERYTHROCYTE [DISTWIDTH] IN BLOOD BY AUTOMATED COUNT: 13.4 % (ref 11.6–15.1)
GFR SERPL CREATININE-BSD FRML MDRD: 37 ML/MIN/1.73SQ M
GLUCOSE SERPL-MCNC: 119 MG/DL (ref 65–140)
GLUCOSE SERPL-MCNC: 142 MG/DL (ref 65–140)
HCT VFR BLD AUTO: 40.2 % (ref 34.8–46.1)
HGB BLD-MCNC: 13.3 G/DL (ref 11.5–15.4)
IMM GRANULOCYTES # BLD AUTO: 0.01 THOUSAND/UL (ref 0–0.2)
IMM GRANULOCYTES NFR BLD AUTO: 0 % (ref 0–2)
LYMPHOCYTES # BLD AUTO: 0.97 THOUSANDS/ΜL (ref 0.6–4.47)
LYMPHOCYTES NFR BLD AUTO: 24 % (ref 14–44)
MCH RBC QN AUTO: 29.3 PG (ref 26.8–34.3)
MCHC RBC AUTO-ENTMCNC: 33.1 G/DL (ref 31.4–37.4)
MCV RBC AUTO: 89 FL (ref 82–98)
MONOCYTES # BLD AUTO: 0.4 THOUSAND/ΜL (ref 0.17–1.22)
MONOCYTES NFR BLD AUTO: 10 % (ref 4–12)
NEUTROPHILS # BLD AUTO: 2.73 THOUSANDS/ΜL (ref 1.85–7.62)
NEUTS SEG NFR BLD AUTO: 66 % (ref 43–75)
NRBC BLD AUTO-RTO: 0 /100 WBCS
PLATELET # BLD AUTO: 143 THOUSANDS/UL (ref 149–390)
PMV BLD AUTO: 12.7 FL (ref 8.9–12.7)
POTASSIUM SERPL-SCNC: 3.4 MMOL/L (ref 3.5–5.3)
PROCALCITONIN SERPL-MCNC: <0.05 NG/ML
RBC # BLD AUTO: 4.54 MILLION/UL (ref 3.81–5.12)
SODIUM SERPL-SCNC: 140 MMOL/L (ref 136–145)
WBC # BLD AUTO: 4.11 THOUSAND/UL (ref 4.31–10.16)

## 2021-03-27 PROCEDURE — 80048 BASIC METABOLIC PNL TOTAL CA: CPT | Performed by: FAMILY MEDICINE

## 2021-03-27 PROCEDURE — 84145 PROCALCITONIN (PCT): CPT | Performed by: FAMILY MEDICINE

## 2021-03-27 PROCEDURE — 85025 COMPLETE CBC W/AUTO DIFF WBC: CPT | Performed by: FAMILY MEDICINE

## 2021-03-27 PROCEDURE — 99239 HOSP IP/OBS DSCHRG MGMT >30: CPT | Performed by: FAMILY MEDICINE

## 2021-03-27 PROCEDURE — 94761 N-INVAS EAR/PLS OXIMETRY MLT: CPT

## 2021-03-27 PROCEDURE — 86140 C-REACTIVE PROTEIN: CPT | Performed by: FAMILY MEDICINE

## 2021-03-27 PROCEDURE — 82948 REAGENT STRIP/BLOOD GLUCOSE: CPT

## 2021-03-27 PROCEDURE — 85379 FIBRIN DEGRADATION QUANT: CPT | Performed by: FAMILY MEDICINE

## 2021-03-27 RX ORDER — MELATONIN
2000 DAILY
Qty: 10 TABLET | Refills: 0 | Status: SHIPPED | OUTPATIENT
Start: 2021-03-28 | End: 2021-04-02

## 2021-03-27 RX ORDER — ZINC SULFATE 50(220)MG
220 CAPSULE ORAL DAILY
Qty: 4 CAPSULE | Refills: 0 | Status: SHIPPED | OUTPATIENT
Start: 2021-03-28 | End: 2021-04-01

## 2021-03-27 RX ADMIN — OXYBUTYNIN 5 MG: 5 TABLET, FILM COATED, EXTENDED RELEASE ORAL at 08:52

## 2021-03-27 RX ADMIN — DOXYCYCLINE 100 MG: 100 CAPSULE ORAL at 12:38

## 2021-03-27 RX ADMIN — ZINC SULFATE 220 MG (50 MG) CAPSULE 220 MG: CAPSULE at 08:52

## 2021-03-27 RX ADMIN — PANTOPRAZOLE SODIUM 40 MG: 40 TABLET, DELAYED RELEASE ORAL at 08:52

## 2021-03-27 RX ADMIN — ASPIRIN 81 MG: 81 TABLET, CHEWABLE ORAL at 08:52

## 2021-03-27 RX ADMIN — Medication 2000 UNITS: at 08:52

## 2021-03-27 RX ADMIN — DEXAMETHASONE SODIUM PHOSPHATE 6 MG: 4 INJECTION, SOLUTION INTRA-ARTICULAR; INTRALESIONAL; INTRAMUSCULAR; INTRAVENOUS; SOFT TISSUE at 12:38

## 2021-03-27 RX ADMIN — HEPARIN SODIUM 5000 UNITS: 5000 INJECTION INTRAVENOUS; SUBCUTANEOUS at 05:43

## 2021-03-27 RX ADMIN — HEPARIN SODIUM 5000 UNITS: 5000 INJECTION INTRAVENOUS; SUBCUTANEOUS at 12:39

## 2021-03-27 RX ADMIN — DOXYCYCLINE 100 MG: 100 CAPSULE ORAL at 00:55

## 2021-03-27 RX ADMIN — CLOPIDOGREL BISULFATE 75 MG: 75 TABLET ORAL at 08:52

## 2021-03-27 RX ADMIN — ATENOLOL 100 MG: 50 TABLET ORAL at 08:52

## 2021-03-27 RX ADMIN — OXYCODONE HYDROCHLORIDE AND ACETAMINOPHEN 1000 MG: 500 TABLET ORAL at 08:52

## 2021-03-27 NOTE — DISCHARGE SUMMARY
[address]  Discharge- [facility] 1945, 76 y o  female MRN: [id number]  Unit/Bed#: [facility] [place] Encounter: [id number]  Primary Care Provider: No primary care provider on file  Date and time admitted to hospital: 3/25/2021  8:55 AM    * Acute respiratory failure with hypoxia (HCC)  Assessment & Plan  · Patient dropped into the 80s on room air & was placed on 2 L o2, now transitioned to RA  · Likely secondary to bilateral COVID-19 pneumonia noted on chest x-ray    Pneumonia due to COVID-19 virus  Assessment & Plan  · COVID-19 pneumonia noted on chest x-ray  · S/P remdesivir( day 3)  · S/P Decadron( day 3)  · IV ceftriaxone/doxycycline( was D/C'd as  prolactin neg )  · Vitamin C/D/zinc X 7 days  · D-dimer 2 29, was started on heparin subcu Q q 8 hours   · Per protocol, her Improv score is 1-2 hence she doesn't qualify for anticoagulant at DC  Patient on room air  Being discharged on vitamin C/D/seen  No further antibiotics/Decadron/remdesivir indicated  Acute renal failure (ARF) (HCC)  Assessment & Plan  · Creatinine 2 1 on arrival  · 1 3 today, closer to baseline  · Pre renal secondary to nausea vomiting diarrhea; off IVF now  · Please note urinalysis was negative for UTI  · Resume chlorthalidone    Diabetes mellitus type 2 in obese [facility])  Assessment & Plan  Lab Results   Component Value Date    HGBA1C 6 5 (H) 12/11/2020       · Holding oral hypoglycemic during hospitalization  · Sliding scale insulin ordered with a c  HS checks  · Consistent carb diet  CVA (cerebral vascular accident) [facility])  Assessment & Plan  · Continue with aspirin/Plavix/atorvastatin    Weakness  Assessment & Plan  Secondary to the nausea vomiting diarrhea  Cleared by PT/OT  Being Dc'd home with home health      Discharging Physician / Practitioner: Tung Houston MD  PCP: No primary care provider on file    Admission Date:   Admission Orders (From admission, onward)     Ordered        03/25/21 1200 Inpatient Admission  Once                   Discharge Date: 03/27/21    Disposition:    Home with home health    Reason for Admission: shortness of breath/ vomiting/ diarrhea    Discharge Diagnoses:   · Acute hypoxic respiratory failure secondary to COVID-19 pneumonia-resolved  · Acute renal failure-resolved  · Generalized weakness-patient be discharged home with home health    Please see assessment and plan section above for further details regarding discharge diagnoses  Medication Adjustments and Discharge Medications:  · Summary of Medication Adjustments made as a result of this hospitalization: see med rec  · Medication Dosing Tapers - Please refer to Discharge Medication List for details on any medication dosing tapers (if applicable to patient)  · Medications being temporarily held (include recommended restart time): see med rec  · Discharge Medication List: See after visit summary for reconciled discharge medications  Wound Care Recommendations:  When applicable, please see wound care section of After Visit Summary  Diet Recommendations at Discharge:  Diet -        Diet Orders   (From admission, onward)             Start     Ordered    03/26/21 0734  Diet Erick/CHO Controlled; Consistent Carbohydrate Diet Level 2 (5 carb servings/75 grams CHO/meal)  Diet effective now     Question Answer Comment   Diet Type Erick/CHO Controlled    Erick/CHO Controlled Consistent Carbohydrate Diet Level 2 (5 carb servings/75 grams CHO/meal)    Special Instructions Disposable Meal    RD to adjust diet per protocol? Yes        03/26/21 795 [facility] Course:     Allyn Nyhan is a 76 y o  female patient who originally presented to the hospital on 3/25/2021 due to nausea vomiting diarrhea  Patient has known history of type 2 diabetes mellitus/hypertension/prior history of CVA on dual antiplatelet therapy      In the emergency department she did have a temperature of 37 5°/no tachycardia but oxygen saturation did drop into the 80s for which she is now on 2 L oxygen via nasal cannula  Labs did show abnormalities such as acute renal failure with a creatinine of 2 18  COVID-19 PCR was positive  CT abdomen pelvis was suggestive of diverticulosis without any evidence of acute diverticulitis  Chest x-ray did note bilateral peripheral ground-glass opacities    For the acute hypoxic respiratory failure secondary to COVID-19 pneumonia; patient was started on remdesivir/Decadron/ceftriaxone/doxycycline  She completed 3 day course of therapy following which it has been discontinued as patient is on room air and asymptomatic  She was started on heparin during hospitalization for anticoagulation but given her D-dimer, she does not meet criteria for anticoagulation at discharge  She will continue vitamin C/D/sink at discharge  Follow-up with PCP in the next 3-4 days  Further nausea vomiting/acute renal failure, patient did receive IV fluids with which her creatinine has resolved  She will resume chlorthalidone at discharge  She was seen and evaluated by PT OT will determine that she was safe to return home with PT  Patient being discharged home today  Condition at Discharge: good     Discharge Day Visit / Exam:     Vitals: Blood Pressure: 138/79 (03/27/21 0726)  Pulse: 55 (03/27/21 0726)  Temperature: (!) 96 1 °F (35 6 °C) (03/27/21 0524)  Temp Source: Oral (03/26/21 0539)  Respirations: 20 (03/27/21 0726)  Height: 5' 4" (162 6 cm) (03/25/21 0856)  Weight - Scale: 105 kg (231 lb 11 3 oz) (03/27/21 0600)  SpO2: 92 % (03/27/21 0726)  Exam:   Physical Exam  Constitutional:       General: She is not in acute distress  Appearance: She is obese  She is not toxic-appearing  HENT:      Mouth/Throat:      Mouth: Mucous membranes are moist       Pharynx: Oropharynx is clear  Cardiovascular:      Rate and Rhythm: Normal rate and regular rhythm  Pulses: Normal pulses     Pulmonary:      Effort: Pulmonary effort is normal       Breath sounds: Normal breath sounds  Abdominal:      General: Bowel sounds are normal  There is no distension  Palpations: Abdomen is soft  Tenderness: There is no abdominal tenderness  Neurological:      General: No focal deficit present  Mental Status: She is alert and oriented to person, place, and time  Goals of Care Discussions:  · Code Status at Discharge: Level 3 - DNAR and DNI    Discharge instructions/Information to patient and family:   See after visit summary section titled Discharge Instructions for information provided to patient and family  Discharge Statement:  I spent 45 minutes discharging the patient  This time was spent on the day of discharge  I had direct contact with the patient on the day of discharge  Greater than 50% of the total time was spent examining patient, answering all patient questions, arranging and discussing plan of care with patient as well as directly providing post-discharge instructions  Additional time then spent on discharge activities      ** Please Note: This note has been constructed using a voice recognition system **

## 2021-03-27 NOTE — RESPIRATORY THERAPY NOTE
Home Oxygen Qualifying Test       Patient name: Ector Blackwell        : 1945   Date of Test:  2021  Diagnosis:      Home Oxygen Test:    **Medicare Guidelines require item(s) 1-5 on all ambulatory patients or 1 and 2 on non-ambulatory patients  1   Baseline SPO2 on Room Air at rest 61 %  2   SPO2 during exercise on Room Air 91 %  During exercise monitor SpO2  If SPO2 increases >=89% with ambulation do not add supplemental             oxygen  If <= 88% on room air add O2 via NC and titrate patient  Patient must be ambulated with O2 and titrated to > 88% with exertion  3   SPO2 on Oxygen at rest  %  lpm     4   SPO2 during exercise on Oxygen  % a liter flow of  lpm     5   Exercise performed:          walking, duration 6 (min)          []  Supplemental Home Oxygen is indicated  [x]  Client does not qualify for home oxygen  Respiratory Additional Notes- pt sat remained between 91-96% while ambulating on RA    Pt will not require home O2    Rose Melissa RT

## 2021-03-27 NOTE — ASSESSMENT & PLAN NOTE
· COVID-19 pneumonia noted on chest x-ray  · S/P remdesivir( day 3)  · S/P Decadron( day 3)  · IV ceftriaxone/doxycycline( was D/C'd as  prolactin neg )  · Vitamin C/D/zinc X 7 days  · D-dimer 2 29, was started on heparin subcu Q q 8 hours   · Per protocol, her Improv score is 1-2 hence she doesn't qualify for anticoagulant at DC  Patient on room air  Being discharged on vitamin C/D/seen  No further antibiotics/Decadron/remdesivir indicated

## 2021-03-27 NOTE — ASSESSMENT & PLAN NOTE
· Creatinine 2 1 on arrival  · 1 3 today, closer to baseline  · Pre renal secondary to nausea vomiting diarrhea; off IVF now  · Please note urinalysis was negative for UTI    · Resume chlorthalidone

## 2021-03-29 NOTE — UTILIZATION REVIEW
Notification of Discharge  This is a Notification of Discharge from our facility [address]  Please be advised that this patient has been discharge from our facility  Below you will find the admission and discharge date and time including the patients disposition  PRESENTATION DATE: 3/25/2021  8:55 AM  OBS ADMISSION DATE:   IP ADMISSION DATE: 3/25/21 1200   DISCHARGE DATE: 3/27/2021  2:34 PM  DISPOSITION: Home with [facility] with [facility]   Admission Orders listed below:  Admission Orders (From admission, onward)     Ordered        03/25/21 1200  Inpatient Admission  Once                   Please contact the UR Department if additional information is required to close this patient's authorization/case  [address] [facility] Utilization Review Department  Main: [phone] x carefully listen to the prompts  All voicemails are confidential   [email or web address]  Send all requests for admission clinical reviews, approved or denied determinations and any other requests to dedicated fax number below belonging to the campus where the patient is receiving treatment   List of dedicated fax numbers:  [address] [place] [address] DENIALS (Administrative/Medical Necessity) [phone]   [address] (Maternity/NICU/Pediatrics) [phone]   [facility] [phone]     [address]  [phone]   Klarissa Paz [phone]   [address] [place] [address] [facility] [phone]   [facility]  [phone]   [address] [facility]  [address] [facility] [address] [facility] [phone] Dapsone Pregnancy And Lactation Text: This medication is Pregnancy Category C and is not considered safe during pregnancy or breast feeding.

## 2021-03-30 LAB
BACTERIA BLD CULT: NORMAL
BACTERIA BLD CULT: NORMAL